# Patient Record
Sex: FEMALE | Race: WHITE | NOT HISPANIC OR LATINO | Employment: OTHER | URBAN - METROPOLITAN AREA
[De-identification: names, ages, dates, MRNs, and addresses within clinical notes are randomized per-mention and may not be internally consistent; named-entity substitution may affect disease eponyms.]

---

## 2023-03-07 DIAGNOSIS — F32.A DEPRESSION, UNSPECIFIED DEPRESSION TYPE: Primary | ICD-10-CM

## 2023-03-07 RX ORDER — DULOXETIN HYDROCHLORIDE 60 MG/1
60 CAPSULE, DELAYED RELEASE ORAL DAILY
Qty: 90 CAPSULE | Refills: 0 | Status: SHIPPED | OUTPATIENT
Start: 2023-03-07 | End: 2023-06-12

## 2023-03-07 RX ORDER — ATORVASTATIN CALCIUM 40 MG/1
1 TABLET, FILM COATED ORAL DAILY
COMMUNITY
Start: 2012-12-17 | End: 2023-07-20

## 2023-03-07 RX ORDER — TORSEMIDE 20 MG/1
1 TABLET ORAL DAILY
COMMUNITY
Start: 2019-04-04

## 2023-03-07 RX ORDER — SITAGLIPTIN AND METFORMIN HYDROCHLORIDE 500; 50 MG/1; MG/1
1 TABLET, FILM COATED ORAL DAILY
COMMUNITY
Start: 2017-02-28 | End: 2023-09-13 | Stop reason: SDUPTHER

## 2023-03-07 RX ORDER — DULOXETIN HYDROCHLORIDE 60 MG/1
1 CAPSULE, DELAYED RELEASE ORAL DAILY
COMMUNITY
Start: 2014-09-09 | End: 2023-03-07 | Stop reason: SDUPTHER

## 2023-03-07 RX ORDER — LEVOTHYROXINE SODIUM 137 UG/1
1 TABLET ORAL DAILY
COMMUNITY
Start: 2017-02-28 | End: 2023-06-12

## 2023-03-07 RX ORDER — CARVEDILOL 25 MG/1
25 TABLET ORAL
COMMUNITY

## 2023-03-07 RX ORDER — LISINOPRIL 2.5 MG/1
1 TABLET ORAL DAILY
COMMUNITY
Start: 2020-01-13 | End: 2023-04-25

## 2023-03-07 RX ORDER — SPIRONOLACTONE 25 MG/1
12.5 TABLET ORAL DAILY
COMMUNITY
Start: 2019-04-04 | End: 2024-01-30 | Stop reason: SDUPTHER

## 2023-04-25 DIAGNOSIS — I10 HYPERTENSION, UNSPECIFIED TYPE: Primary | ICD-10-CM

## 2023-04-25 RX ORDER — LISINOPRIL 2.5 MG/1
TABLET ORAL
Qty: 90 TABLET | Refills: 3 | Status: SHIPPED | OUTPATIENT
Start: 2023-04-25 | End: 2024-01-30 | Stop reason: SDUPTHER

## 2023-06-05 DIAGNOSIS — F32.A DEPRESSION, UNSPECIFIED DEPRESSION TYPE: ICD-10-CM

## 2023-06-05 DIAGNOSIS — E03.9 HYPOTHYROIDISM, UNSPECIFIED TYPE: ICD-10-CM

## 2023-06-12 RX ORDER — LEVOTHYROXINE SODIUM 137 UG/1
TABLET ORAL
Qty: 90 TABLET | Refills: 3 | Status: SHIPPED | OUTPATIENT
Start: 2023-06-12

## 2023-06-12 RX ORDER — DULOXETIN HYDROCHLORIDE 60 MG/1
CAPSULE, DELAYED RELEASE ORAL
Qty: 90 CAPSULE | Refills: 3 | Status: SHIPPED | OUTPATIENT
Start: 2023-06-12

## 2023-06-21 ENCOUNTER — APPOINTMENT (OUTPATIENT)
Dept: PRIMARY CARE | Facility: CLINIC | Age: 73
End: 2023-06-21
Payer: MEDICARE

## 2023-06-22 ENCOUNTER — OFFICE VISIT (OUTPATIENT)
Dept: PRIMARY CARE | Facility: CLINIC | Age: 73
End: 2023-06-22
Payer: MEDICARE

## 2023-06-22 ENCOUNTER — TELEPHONE (OUTPATIENT)
Dept: PRIMARY CARE | Facility: CLINIC | Age: 73
End: 2023-06-22

## 2023-06-22 VITALS
RESPIRATION RATE: 16 BRPM | BODY MASS INDEX: 47.77 KG/M2 | DIASTOLIC BLOOD PRESSURE: 62 MMHG | OXYGEN SATURATION: 97 % | HEIGHT: 61 IN | WEIGHT: 253 LBS | SYSTOLIC BLOOD PRESSURE: 116 MMHG | HEART RATE: 75 BPM

## 2023-06-22 DIAGNOSIS — M54.12 CERVICAL RADICULOPATHY: Primary | ICD-10-CM

## 2023-06-22 PROCEDURE — 1036F TOBACCO NON-USER: CPT | Performed by: STUDENT IN AN ORGANIZED HEALTH CARE EDUCATION/TRAINING PROGRAM

## 2023-06-22 PROCEDURE — 1159F MED LIST DOCD IN RCRD: CPT | Performed by: STUDENT IN AN ORGANIZED HEALTH CARE EDUCATION/TRAINING PROGRAM

## 2023-06-22 PROCEDURE — 99213 OFFICE O/P EST LOW 20 MIN: CPT | Performed by: STUDENT IN AN ORGANIZED HEALTH CARE EDUCATION/TRAINING PROGRAM

## 2023-06-22 PROCEDURE — 1160F RVW MEDS BY RX/DR IN RCRD: CPT | Performed by: STUDENT IN AN ORGANIZED HEALTH CARE EDUCATION/TRAINING PROGRAM

## 2023-06-22 RX ORDER — PREDNISONE 20 MG/1
TABLET ORAL
COMMUNITY
Start: 2023-06-19 | End: 2023-11-22 | Stop reason: WASHOUT

## 2023-06-22 RX ORDER — VIT C/E/ZN/COPPR/LUTEIN/ZEAXAN 250MG-90MG
CAPSULE ORAL
Status: ON HOLD | COMMUNITY
End: 2023-12-15 | Stop reason: DRUGHIGH

## 2023-06-22 RX ORDER — PEDI MULTIVIT NO.16 W-FLUORIDE 1 MG
TABLET,CHEWABLE ORAL
Status: ON HOLD | COMMUNITY
Start: 2013-12-03 | End: 2023-12-15 | Stop reason: SDUPTHER

## 2023-06-22 RX ORDER — BACLOFEN 20 MG
TABLET ORAL
Status: ON HOLD | COMMUNITY
Start: 2015-07-07 | End: 2023-12-15 | Stop reason: SDUPTHER

## 2023-06-22 RX ORDER — TIZANIDINE 4 MG/1
4 TABLET ORAL 3 TIMES DAILY
COMMUNITY
Start: 2023-06-19 | End: 2023-11-22 | Stop reason: WASHOUT

## 2023-06-22 RX ORDER — CALCIUM CARBONATE 600 MG
TABLET ORAL
Status: ON HOLD | COMMUNITY
End: 2023-12-15 | Stop reason: ALTCHOICE

## 2023-06-22 RX ORDER — LIDOCAINE 4 G/100G
PATCH TOPICAL
COMMUNITY
Start: 2022-09-18 | End: 2023-11-22 | Stop reason: WASHOUT

## 2023-06-22 RX ORDER — UBIDECARENONE 75 MG
CAPSULE ORAL
Status: ON HOLD | COMMUNITY
End: 2023-12-15 | Stop reason: ALTCHOICE

## 2023-06-22 RX ORDER — ASPIRIN 81 MG/1
1 TABLET ORAL DAILY
COMMUNITY
Start: 2015-05-14

## 2023-06-22 RX ORDER — EPINEPHRINE 0.22MG
200 AEROSOL WITH ADAPTER (ML) INHALATION DAILY
COMMUNITY
Start: 2017-02-28

## 2023-06-22 ASSESSMENT — PATIENT HEALTH QUESTIONNAIRE - PHQ9
SUM OF ALL RESPONSES TO PHQ9 QUESTIONS 1 AND 2: 0
2. FEELING DOWN, DEPRESSED OR HOPELESS: NOT AT ALL
1. LITTLE INTEREST OR PLEASURE IN DOING THINGS: NOT AT ALL

## 2023-06-22 ASSESSMENT — ENCOUNTER SYMPTOMS
LOSS OF SENSATION IN FEET: 0
OCCASIONAL FEELINGS OF UNSTEADINESS: 1
DEPRESSION: 0

## 2023-06-22 NOTE — PROGRESS NOTES
Subjective   Patient ID: Nadeen Nguyễn is a 72 y.o. female who presents for Follow-up (Pt was seen at the  on Monday and was told that she had a pinched nerve. Was placed in muscle relaxer and prednisone. Pt is now experiencing issues with her left issue and numbness with her left pinkie.).    Started having some left shoulder blade pain after doing vacation [place]   Has been pushing a person   Neck pain with radiation down the left shoulder   No chest pain or pressure   Went to urgent care on Monday  She was placed on steroid and muscle relaxant  Completing her taper which is improving   Her hand is tingling.  Getting a message tomorrow        Review of Systems    Objective   Physical Exam  Constitutional:       Appearance: Normal appearance.   Cardiovascular:      Rate and Rhythm: Normal rate and regular rhythm.      Heart sounds: No murmur heard.  Pulmonary:      Effort: Pulmonary effort is normal. No respiratory distress.      Breath sounds: Normal breath sounds. No wheezing.   Musculoskeletal:      Cervical back: Neck supple.      Left lower leg: No edema.   Neurological:      Mental Status: She is alert.         Assessment/Plan   Diagnoses and all orders for this visit:  Cervical radiculopathy  Comments:  continue your medrol dose pack  exercises given   rest from strenuous activity

## 2023-07-05 ENCOUNTER — TELEPHONE (OUTPATIENT)
Dept: PRIMARY CARE | Facility: CLINIC | Age: 73
End: 2023-07-05
Payer: MEDICARE

## 2023-07-05 DIAGNOSIS — M54.12 CERVICAL RADICULOPATHY: Primary | ICD-10-CM

## 2023-07-05 NOTE — TELEPHONE ENCOUNTER
Pt called asking for a PT referral, states she was scheduled in a few weeks to see PT, but they happened to call her due to a cancellation and were able to get her in tomorrow at 11:00.     She just needs a PT referral letter, once completed I can fax to [facility] at [phone].

## 2023-07-20 DIAGNOSIS — E78.5 HYPERLIPIDEMIA, UNSPECIFIED HYPERLIPIDEMIA TYPE: ICD-10-CM

## 2023-07-20 RX ORDER — ATORVASTATIN CALCIUM 40 MG/1
40 TABLET, FILM COATED ORAL DAILY
Qty: 90 TABLET | Refills: 1 | Status: SHIPPED | OUTPATIENT
Start: 2023-07-20

## 2023-08-15 ENCOUNTER — OFFICE VISIT (OUTPATIENT)
Dept: PRIMARY CARE | Facility: CLINIC | Age: 73
End: 2023-08-15
Payer: MEDICARE

## 2023-08-15 VITALS
OXYGEN SATURATION: 97 % | WEIGHT: 247.6 LBS | RESPIRATION RATE: 18 BRPM | DIASTOLIC BLOOD PRESSURE: 64 MMHG | BODY MASS INDEX: 46.75 KG/M2 | SYSTOLIC BLOOD PRESSURE: 118 MMHG | HEIGHT: 61 IN | HEART RATE: 91 BPM

## 2023-08-15 DIAGNOSIS — E11.9 TYPE 2 DIABETES MELLITUS WITHOUT COMPLICATION, WITHOUT LONG-TERM CURRENT USE OF INSULIN (MULTI): ICD-10-CM

## 2023-08-15 DIAGNOSIS — Z00.00 ROUTINE GENERAL MEDICAL EXAMINATION AT HEALTH CARE FACILITY: Primary | ICD-10-CM

## 2023-08-15 DIAGNOSIS — Z12.31 VISIT FOR SCREENING MAMMOGRAM: ICD-10-CM

## 2023-08-15 DIAGNOSIS — I10 ESSENTIAL HYPERTENSION: ICD-10-CM

## 2023-08-15 DIAGNOSIS — I49.01 VENTRICULAR FIBRILLATION (MULTI): ICD-10-CM

## 2023-08-15 DIAGNOSIS — E03.9 HYPOTHYROIDISM, UNSPECIFIED TYPE: ICD-10-CM

## 2023-08-15 DIAGNOSIS — Z23 IMMUNIZATION DUE: ICD-10-CM

## 2023-08-15 DIAGNOSIS — H90.6 MIXED CONDUCTIVE AND SENSORINEURAL HEARING LOSS OF BOTH EARS: ICD-10-CM

## 2023-08-15 DIAGNOSIS — I47.20 VENTRICULAR TACHYCARDIA (MULTI): ICD-10-CM

## 2023-08-15 PROBLEM — G47.33 OBSTRUCTIVE SLEEP APNEA: Status: ACTIVE | Noted: 2023-08-15

## 2023-08-15 PROBLEM — F32.A DEPRESSION: Status: ACTIVE | Noted: 2023-08-15

## 2023-08-15 PROBLEM — I20.9 ANGINA PECTORIS (CMS-HCC): Status: ACTIVE | Noted: 2023-08-15
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
## 2023-08-15 PROBLEM — H91.90 HEARING LOSS: Status: ACTIVE | Noted: 2023-08-15

## 2023-08-15 PROBLEM — I50.9 HEART FAILURE (MULTI): Status: ACTIVE | Noted: 2023-08-15

## 2023-08-15 PROBLEM — M25.511 CHRONIC RIGHT SHOULDER PAIN: Status: ACTIVE | Noted: 2023-08-15

## 2023-08-15 PROBLEM — R06.02 SHORTNESS OF BREATH: Status: ACTIVE | Noted: 2023-08-15

## 2023-08-15 PROBLEM — I25.10 CORONARY ARTERY DISEASE: Status: ACTIVE | Noted: 2023-08-15

## 2023-08-15 PROBLEM — G89.29 CHRONIC RIGHT SHOULDER PAIN: Status: ACTIVE | Noted: 2023-08-15

## 2023-08-15 PROBLEM — F51.12 INSUFFICIENT SLEEP SYNDROME: Status: ACTIVE | Noted: 2023-08-15

## 2023-08-15 PROBLEM — H00.016 HORDEOLUM EXTERNUM OF LEFT EYE: Status: ACTIVE | Noted: 2023-08-15

## 2023-08-15 PROBLEM — E78.2 MIXED HYPERLIPIDEMIA: Status: ACTIVE | Noted: 2023-08-15

## 2023-08-15 PROBLEM — Z95.810 CARDIAC DEFIBRILLATOR IN PLACE: Status: ACTIVE | Noted: 2023-08-15

## 2023-08-15 PROBLEM — E78.5 HYPERLIPIDEMIA: Status: ACTIVE | Noted: 2023-08-15

## 2023-08-15 PROBLEM — M53.86 SCIATICA OF RIGHT SIDE ASSOCIATED WITH DISORDER OF LUMBAR SPINE: Status: ACTIVE | Noted: 2023-08-15

## 2023-08-15 PROBLEM — I25.118 ATHEROSCLEROTIC HEART DISEASE OF NATIVE CORONARY ARTERY WITH OTHER FORMS OF ANGINA PECTORIS (CMS-HCC): Status: ACTIVE | Noted: 2023-08-15

## 2023-08-15 PROBLEM — D72.10 EOSINOPHILIA: Status: ACTIVE | Noted: 2023-08-15

## 2023-08-15 PROBLEM — M17.10 ARTHRITIS OF KNEE: Status: ACTIVE | Noted: 2023-08-15

## 2023-08-15 PROBLEM — I42.9 CARDIOMYOPATHY (MULTI): Status: ACTIVE | Noted: 2023-08-15

## 2023-08-15 PROBLEM — G47.36 SLEEP-RELATED HYPOVENTILATION DUE TO MEDICAL CONDITION: Status: ACTIVE | Noted: 2023-08-15

## 2023-08-15 PROBLEM — E66.01 MORBID OBESITY (MULTI): Status: ACTIVE | Noted: 2023-08-15

## 2023-08-15 LAB — POC HEMOGLOBIN A1C: 7.1 % (ref 4.2–6.5)

## 2023-08-15 PROCEDURE — 90471 IMMUNIZATION ADMIN: CPT | Performed by: STUDENT IN AN ORGANIZED HEALTH CARE EDUCATION/TRAINING PROGRAM

## 2023-08-15 PROCEDURE — 3078F DIAST BP <80 MM HG: CPT | Performed by: STUDENT IN AN ORGANIZED HEALTH CARE EDUCATION/TRAINING PROGRAM

## 2023-08-15 PROCEDURE — 83036 HEMOGLOBIN GLYCOSYLATED A1C: CPT | Performed by: STUDENT IN AN ORGANIZED HEALTH CARE EDUCATION/TRAINING PROGRAM

## 2023-08-15 PROCEDURE — 1160F RVW MEDS BY RX/DR IN RCRD: CPT | Performed by: STUDENT IN AN ORGANIZED HEALTH CARE EDUCATION/TRAINING PROGRAM

## 2023-08-15 PROCEDURE — 1126F AMNT PAIN NOTED NONE PRSNT: CPT | Performed by: STUDENT IN AN ORGANIZED HEALTH CARE EDUCATION/TRAINING PROGRAM

## 2023-08-15 PROCEDURE — 90715 TDAP VACCINE 7 YRS/> IM: CPT | Performed by: STUDENT IN AN ORGANIZED HEALTH CARE EDUCATION/TRAINING PROGRAM

## 2023-08-15 PROCEDURE — 4010F ACE/ARB THERAPY RXD/TAKEN: CPT | Performed by: STUDENT IN AN ORGANIZED HEALTH CARE EDUCATION/TRAINING PROGRAM

## 2023-08-15 PROCEDURE — 1036F TOBACCO NON-USER: CPT | Performed by: STUDENT IN AN ORGANIZED HEALTH CARE EDUCATION/TRAINING PROGRAM

## 2023-08-15 PROCEDURE — 1170F FXNL STATUS ASSESSED: CPT | Performed by: STUDENT IN AN ORGANIZED HEALTH CARE EDUCATION/TRAINING PROGRAM

## 2023-08-15 PROCEDURE — 3074F SYST BP LT 130 MM HG: CPT | Performed by: STUDENT IN AN ORGANIZED HEALTH CARE EDUCATION/TRAINING PROGRAM

## 2023-08-15 PROCEDURE — 99214 OFFICE O/P EST MOD 30 MIN: CPT | Performed by: STUDENT IN AN ORGANIZED HEALTH CARE EDUCATION/TRAINING PROGRAM

## 2023-08-15 PROCEDURE — 1159F MED LIST DOCD IN RCRD: CPT | Performed by: STUDENT IN AN ORGANIZED HEALTH CARE EDUCATION/TRAINING PROGRAM

## 2023-08-15 PROCEDURE — G0439 PPPS, SUBSEQ VISIT: HCPCS | Performed by: STUDENT IN AN ORGANIZED HEALTH CARE EDUCATION/TRAINING PROGRAM

## 2023-08-15 RX ORDER — BISACODYL 5 MG/1
1 TABLET, COATED ORAL DAILY
COMMUNITY

## 2023-08-15 RX ORDER — IBUPROFEN 200 MG
400 CAPSULE ORAL 2 TIMES DAILY
COMMUNITY

## 2023-08-15 ASSESSMENT — PATIENT HEALTH QUESTIONNAIRE - PHQ9
1. LITTLE INTEREST OR PLEASURE IN DOING THINGS: NOT AT ALL
SUM OF ALL RESPONSES TO PHQ9 QUESTIONS 1 AND 2: 0
2. FEELING DOWN, DEPRESSED OR HOPELESS: NOT AT ALL

## 2023-08-15 ASSESSMENT — ACTIVITIES OF DAILY LIVING (ADL)
TAKING_MEDICATION: INDEPENDENT
DOING_HOUSEWORK: INDEPENDENT
MANAGING_FINANCES: INDEPENDENT
BATHING: INDEPENDENT
GROCERY_SHOPPING: INDEPENDENT
DRESSING: INDEPENDENT

## 2023-08-15 NOTE — ASSESSMENT & PLAN NOTE
In office A1C was 7.1 up from 6.1    At this time, we will continue current medications. W. Advised to avoid fatty foods such as processed food, weets; avoid foods heavy in sugar and salt. Maintain regular exercise 20 min daily or a total of 120 min weekly of moderate exercise.   labs:cbc, cmp

## 2023-08-15 NOTE — ASSESSMENT & PLAN NOTE
>>ASSESSMENT AND PLAN FOR TYPE 2 DIABETES MELLITUS WRITTEN ON 8/15/2023 10:13 AM BY MARCELINA GARZA, DO    In office A1C was 7.1 up from 6.1    At this time, we will continue current medications. W. Advised to avoid fatty foods such as processed food, weets; avoid foods heavy in sugar and salt. Maintain regular exercise 20 min daily or a total of 120 min weekly of moderate exercise.   labs:cbc, cmp

## 2023-08-15 NOTE — ASSESSMENT & PLAN NOTE
refilled lisinopril 2.5mg  Physical exam was stable. Discussed maintaining diets such as DASH to help maintain normal Blood pressure.

## 2023-08-15 NOTE — PROGRESS NOTES
"Subjective   Reason for Visit: Nadeen Nguyễn is an 73 y.o. female here for a Medicare Wellness visit.     Past Medical, Surgical, and Family History reviewed and updated in chart.    Reviewed all medications by prescribing practitioner or clinical pharmacist (such as prescriptions, OTCs, herbal therapies and supplements) and documented in the medical record.    Interval Hx:    Cervical Pain continues  but  Physical therapy has been very helpful          Patient Care Team:  Jennifer Osorio DO as PCP - General  Jennifer Osorio DO as PCP - [employer]age PCP  Shailesh Valerio MD (Internal Medicine)  Jennifer Osorio DO     Review of Systems    Objective   Vitals:  /64   Pulse 91   Resp 18   Ht 1.549 m (5' 1\")   Wt 112 kg (247 lb 9.6 oz)   SpO2 97%   BMI 46.78 kg/m²       Physical Exam  Vitals reviewed.   Constitutional:       General: She is not in acute distress.     Appearance: She is not ill-appearing.   HENT:      Right Ear: Tympanic membrane and ear canal normal.      Left Ear: Tympanic membrane and ear canal normal.      Mouth/Throat:      Mouth: Mucous membranes are moist.      Pharynx: Oropharynx is clear. No oropharyngeal exudate or posterior oropharyngeal erythema.   Eyes:      Extraocular Movements: Extraocular movements intact.      Conjunctiva/sclera: Conjunctivae normal.      Pupils: Pupils are equal, round, and reactive to light.   Neck:      Vascular: No carotid bruit.   Cardiovascular:      Rate and Rhythm: Normal rate and regular rhythm.      Heart sounds: No murmur heard.     No gallop.   Pulmonary:      Effort: Pulmonary effort is normal.      Breath sounds: Normal breath sounds. No wheezing, rhonchi or rales.   Abdominal:      General: Abdomen is flat. Bowel sounds are normal.      Palpations: Abdomen is soft.      Tenderness: There is no abdominal tenderness.   Musculoskeletal:      Cervical back: Neck supple.      Left lower leg: No edema.   Skin:     General: Skin is " warm and dry.      Findings: No rash.   Neurological:      General: No focal deficit present.      Mental Status: She is alert and oriented to person, place, and time.      Gait: Gait normal.   Psychiatric:         Mood and Affect: Mood normal.         Behavior: Behavior normal.       Assessment/Plan   Problem List Items Addressed This Visit       Essential hypertension    Current Assessment & Plan     refilled lisinopril 2.5mg  Physical exam was stable. Discussed maintaining diets such as DASH to help maintain normal Blood pressure.             Relevant Orders    CBC and Auto Differential    Comprehensive metabolic panel    Lipid Panel    Hearing loss    Relevant Orders    Referral to Audiology    Hypothyroidism    Relevant Orders    Tsh With Reflex To Free T4 If Abnormal    Type 2 diabetes mellitus (CMS/HCC)    Current Assessment & Plan     In office A1C was 7.1 up from 6.1    At this time, we will continue current medications. W. Advised to avoid fatty foods such as processed food, weets; avoid foods heavy in sugar and salt. Maintain regular exercise 20 min daily or a total of 120 min weekly of moderate exercise.   labs:cbc, cmp         Relevant Orders    POCT glycosylated hemoglobin (Hb A1C) manually resulted (Completed)    Ventricular fibrillation (CMS/HCC)    Ventricular tachycardia (CMS/HCC)     Other Visit Diagnoses       Routine general medical examination at health care facility    -  Primary    doing well  will update audiology visit   mammogram order    Visit for screening mammogram        Relevant Orders    BI mammo bilateral screening tomosynthesis    Immunization due        Relevant Orders    Tdap vaccine, age 10 years and older  (BOOSTRIX)

## 2023-09-13 DIAGNOSIS — E11.9 TYPE 2 DIABETES MELLITUS WITHOUT COMPLICATION, WITHOUT LONG-TERM CURRENT USE OF INSULIN (MULTI): ICD-10-CM

## 2023-09-13 RX ORDER — SITAGLIPTIN AND METFORMIN HYDROCHLORIDE 500; 50 MG/1; MG/1
1 TABLET, FILM COATED ORAL DAILY
Qty: 90 TABLET | Refills: 1 | Status: SHIPPED | OUTPATIENT
Start: 2023-09-13 | End: 2024-05-20 | Stop reason: SDUPTHER

## 2023-09-19 ENCOUNTER — LAB (OUTPATIENT)
Dept: LAB | Facility: LAB | Age: 73
End: 2023-09-19
Payer: MEDICARE

## 2023-09-19 DIAGNOSIS — I10 ESSENTIAL HYPERTENSION: ICD-10-CM

## 2023-09-19 DIAGNOSIS — E03.9 HYPOTHYROIDISM, UNSPECIFIED TYPE: ICD-10-CM

## 2023-09-19 LAB
BASOPHILS (10*3/UL) IN BLOOD BY AUTOMATED COUNT: 0.1 X10E9/L (ref 0–0.1)
BASOPHILS/100 LEUKOCYTES IN BLOOD BY AUTOMATED COUNT: 0.9 % (ref 0–2)
EOSINOPHILS (10*3/UL) IN BLOOD BY AUTOMATED COUNT: 0.46 X10E9/L (ref 0–0.4)
EOSINOPHILS/100 LEUKOCYTES IN BLOOD BY AUTOMATED COUNT: 4.1 % (ref 0–6)
ERYTHROCYTE DISTRIBUTION WIDTH (RATIO) BY AUTOMATED COUNT: 12.8 % (ref 11.5–14.5)
ERYTHROCYTE MEAN CORPUSCULAR HEMOGLOBIN CONCENTRATION (G/DL) BY AUTOMATED: 32.4 G/DL (ref 32–36)
ERYTHROCYTE MEAN CORPUSCULAR VOLUME (FL) BY AUTOMATED COUNT: 91 FL (ref 80–100)
ERYTHROCYTES (10*6/UL) IN BLOOD BY AUTOMATED COUNT: 5 X10E12/L (ref 4–5.2)
HEMATOCRIT (%) IN BLOOD BY AUTOMATED COUNT: 45.4 % (ref 36–46)
HEMOGLOBIN (G/DL) IN BLOOD: 14.7 G/DL (ref 12–16)
IMMATURE GRANULOCYTES/100 LEUKOCYTES IN BLOOD BY AUTOMATED COUNT: 0.5 % (ref 0–0.9)
LEUKOCYTES (10*3/UL) IN BLOOD BY AUTOMATED COUNT: 11.2 X10E9/L (ref 4.4–11.3)
LYMPHOCYTES (10*3/UL) IN BLOOD BY AUTOMATED COUNT: 2.2 X10E9/L (ref 0.8–3)
LYMPHOCYTES/100 LEUKOCYTES IN BLOOD BY AUTOMATED COUNT: 19.7 % (ref 13–44)
MONOCYTES (10*3/UL) IN BLOOD BY AUTOMATED COUNT: 0.99 X10E9/L (ref 0.05–0.8)
MONOCYTES/100 LEUKOCYTES IN BLOOD BY AUTOMATED COUNT: 8.9 % (ref 2–10)
NEUTROPHILS (10*3/UL) IN BLOOD BY AUTOMATED COUNT: 7.34 X10E9/L (ref 1.6–5.5)
NEUTROPHILS/100 LEUKOCYTES IN BLOOD BY AUTOMATED COUNT: 65.9 % (ref 40–80)
PLATELETS (10*3/UL) IN BLOOD AUTOMATED COUNT: 228 X10E9/L (ref 150–450)

## 2023-09-19 PROCEDURE — 80061 LIPID PANEL: CPT

## 2023-09-19 PROCEDURE — 84443 ASSAY THYROID STIM HORMONE: CPT

## 2023-09-19 PROCEDURE — 36415 COLL VENOUS BLD VENIPUNCTURE: CPT

## 2023-09-19 PROCEDURE — 85025 COMPLETE CBC W/AUTO DIFF WBC: CPT

## 2023-09-19 PROCEDURE — 80053 COMPREHEN METABOLIC PANEL: CPT

## 2023-09-20 DIAGNOSIS — N17.9 AKI (ACUTE KIDNEY INJURY) (CMS-HCC): Primary | ICD-10-CM

## 2023-09-20 LAB
ALANINE AMINOTRANSFERASE (SGPT) (U/L) IN SER/PLAS: 11 U/L (ref 7–45)
ALBUMIN (G/DL) IN SER/PLAS: 4 G/DL (ref 3.4–5)
ALKALINE PHOSPHATASE (U/L) IN SER/PLAS: 81 U/L (ref 33–136)
ANION GAP IN SER/PLAS: 17 MMOL/L (ref 10–20)
ASPARTATE AMINOTRANSFERASE (SGOT) (U/L) IN SER/PLAS: 18 U/L (ref 9–39)
BILIRUBIN TOTAL (MG/DL) IN SER/PLAS: 1.7 MG/DL (ref 0–1.2)
CALCIUM (MG/DL) IN SER/PLAS: 9.6 MG/DL (ref 8.6–10.3)
CARBON DIOXIDE, TOTAL (MMOL/L) IN SER/PLAS: 28 MMOL/L (ref 21–32)
CHLORIDE (MMOL/L) IN SER/PLAS: 100 MMOL/L (ref 98–107)
CHOLESTEROL (MG/DL) IN SER/PLAS: 142 MG/DL (ref 0–199)
CHOLESTEROL IN HDL (MG/DL) IN SER/PLAS: 49.6 MG/DL
CHOLESTEROL/HDL RATIO: 2.9
CREATININE (MG/DL) IN SER/PLAS: 1.16 MG/DL (ref 0.5–1.05)
GFR FEMALE: 50 ML/MIN/1.73M2
GLUCOSE (MG/DL) IN SER/PLAS: 163 MG/DL (ref 74–99)
LDL: 59 MG/DL (ref 0–99)
POTASSIUM (MMOL/L) IN SER/PLAS: 5 MMOL/L (ref 3.5–5.3)
PROTEIN TOTAL: 7 G/DL (ref 6.4–8.2)
SODIUM (MMOL/L) IN SER/PLAS: 140 MMOL/L (ref 136–145)
THYROTROPIN (MIU/L) IN SER/PLAS BY DETECTION LIMIT <= 0.05 MIU/L: 0.61 MIU/L (ref 0.44–3.98)
TRIGLYCERIDE (MG/DL) IN SER/PLAS: 166 MG/DL (ref 0–149)
UREA NITROGEN (MG/DL) IN SER/PLAS: 22 MG/DL (ref 6–23)
VLDL: 33 MG/DL (ref 0–40)

## 2023-11-08 ENCOUNTER — OFFICE VISIT (OUTPATIENT)
Dept: CARDIOLOGY | Facility: CLINIC | Age: 73
End: 2023-11-08
Payer: MEDICARE

## 2023-11-08 ENCOUNTER — HOSPITAL ENCOUNTER (OUTPATIENT)
Dept: CARDIOLOGY | Facility: CLINIC | Age: 73
Discharge: HOME | End: 2023-11-08
Payer: MEDICARE

## 2023-11-08 DIAGNOSIS — I42.9 CARDIOMYOPATHY, UNSPECIFIED TYPE (MULTI): ICD-10-CM

## 2023-11-08 DIAGNOSIS — Z95.810 CARDIAC DEFIBRILLATOR IN PLACE: ICD-10-CM

## 2023-11-08 DIAGNOSIS — I42.9 CARDIOMYOPATHY, UNSPECIFIED TYPE (MULTI): Primary | ICD-10-CM

## 2023-11-08 DIAGNOSIS — I49.01 VENTRICULAR FIBRILLATION (MULTI): Primary | ICD-10-CM

## 2023-11-08 DIAGNOSIS — I10 ESSENTIAL HYPERTENSION: ICD-10-CM

## 2023-11-08 PROCEDURE — 93284 PRGRMG EVAL IMPLANTABLE DFB: CPT | Performed by: INTERNAL MEDICINE

## 2023-11-08 PROCEDURE — 4010F ACE/ARB THERAPY RXD/TAKEN: CPT | Performed by: INTERNAL MEDICINE

## 2023-11-08 PROCEDURE — 99215 OFFICE O/P EST HI 40 MIN: CPT | Mod: 25 | Performed by: INTERNAL MEDICINE

## 2023-11-08 PROCEDURE — 3078F DIAST BP <80 MM HG: CPT | Performed by: INTERNAL MEDICINE

## 2023-11-08 PROCEDURE — 93290 INTERROG DEV EVAL ICPMS IP: CPT

## 2023-11-08 PROCEDURE — 1126F AMNT PAIN NOTED NONE PRSNT: CPT | Performed by: INTERNAL MEDICINE

## 2023-11-08 PROCEDURE — 1160F RVW MEDS BY RX/DR IN RCRD: CPT | Performed by: INTERNAL MEDICINE

## 2023-11-08 PROCEDURE — 93290 INTERROG DEV EVAL ICPMS IP: CPT | Performed by: INTERNAL MEDICINE

## 2023-11-08 PROCEDURE — 3074F SYST BP LT 130 MM HG: CPT | Performed by: INTERNAL MEDICINE

## 2023-11-08 PROCEDURE — 1036F TOBACCO NON-USER: CPT | Performed by: INTERNAL MEDICINE

## 2023-11-08 PROCEDURE — 1159F MED LIST DOCD IN RCRD: CPT | Performed by: INTERNAL MEDICINE

## 2023-11-08 PROCEDURE — 99205 OFFICE O/P NEW HI 60 MIN: CPT | Performed by: INTERNAL MEDICINE

## 2023-11-08 NOTE — PROGRESS NOTES
OUTPATIENT CONSULTATION: Cardiac Electrophysiology  DOS: 11/08/2023  REASON: Malfunctioning ICD  REFERRING PHYSICIAN: [facility]      I had a pleasure seeing Nadeen Nguyễn. She is 73 year old with    S/p CRT-D in 2014 for low EF  Complete heart block - she has no escape rhythm  S/p battery change out ([employer]) in 2017 for recalled battery  Today, we did check the device and her LV lead is not capturing. She has felt more short of breath.     [unfilled]  Physical Exam  There were no vitals taken for this visit.    General Appearance:  Alert, cooperative, no distress, appears stated age   Head:  Normocephalic, without obvious abnormality, atraumatic   Eyes:  PERRL, conjunctiva/corneas clear, EOM's intact, fundi benign, both eyes   Ears:  Normal TM's and external ear canals, both ears   Nose: Nares normal, septum midline,mucosa normal, no drainage or sinus tenderness   Throat: Lips, mucosa, and tongue normal; teeth and gums normal   Neck: Supple, symmetrical, trachea midline, no adenopathy;  thyroid: not enlarged, symmetric, no tenderness/mass/nodules; no carotid bruit or JVD   Back:   Symmetric, no curvature, ROM normal, no CVA tenderness   Lungs:   Clear to auscultation bilaterally, respirations unlabored   Breasts:  No masses or tenderness   Heart:  Regular rate and rhythm, S1 and S2 normal, no murmur, rub, or gallop   Abdomen:   Soft, non-tender, bowel sounds active all four quadrants,  no masses, no organomegaly   Pelvic: Deferred   Extremities: Extremities normal, atraumatic, no cyanosis or edema   Pulses: 2+ and symmetric   Skin: Skin color, texture, turgor normal, no rashes or lesions   Lymph nodes: Cervical, supraclavicular, and axillary nodes normal   Neurologic: Normal       LV lead is not capturing at highest outputs. She is only RV pacing. I reached to her cardiologist at [facility] to assess the medical therapy and last EF. I do suspect she will need LV revision and probably the best would be to place an  epicardial lead.       I did reach to her cardiologist to obtain the records.

## 2023-11-08 NOTE — ASSESSMENT & PLAN NOTE
LV lead is not capturing at highest outputs. She is only RV pacing. I reached to her cardiologist at [facility] to assess the medical therapy and last EF. I do suspect she will need LV revision and probably the best would be to place an epicardial lead.

## 2023-11-20 PROBLEM — D72.829 LEUKOCYTOSIS: Status: ACTIVE | Noted: 2023-11-20
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
## 2023-11-20 PROBLEM — R29.6 FALLS: Status: ACTIVE | Noted: 2023-11-20

## 2023-11-20 PROBLEM — H90.3 SENSORINEURAL HEARING LOSS (SNHL) OF BOTH EARS: Status: ACTIVE | Noted: 2023-11-20

## 2023-11-22 ENCOUNTER — OFFICE VISIT (OUTPATIENT)
Dept: CARDIOLOGY | Facility: CLINIC | Age: 73
End: 2023-11-22
Payer: MEDICARE

## 2023-11-22 VITALS
RESPIRATION RATE: 18 BRPM | HEART RATE: 71 BPM | OXYGEN SATURATION: 96 % | HEIGHT: 61 IN | BODY MASS INDEX: 45.65 KG/M2 | WEIGHT: 241.8 LBS | DIASTOLIC BLOOD PRESSURE: 70 MMHG | TEMPERATURE: 98.9 F | SYSTOLIC BLOOD PRESSURE: 136 MMHG

## 2023-11-22 DIAGNOSIS — I20.9 ANGINA PECTORIS (CMS-HCC): ICD-10-CM

## 2023-11-22 DIAGNOSIS — I10 PRIMARY HYPERTENSION: ICD-10-CM

## 2023-11-22 DIAGNOSIS — I50.32 CHRONIC DIASTOLIC HEART FAILURE (MULTI): ICD-10-CM

## 2023-11-22 DIAGNOSIS — I10 ESSENTIAL HYPERTENSION: ICD-10-CM

## 2023-11-22 DIAGNOSIS — E78.2 MIXED HYPERLIPIDEMIA: ICD-10-CM

## 2023-11-22 DIAGNOSIS — I25.118 ATHEROSCLEROTIC HEART DISEASE OF NATIVE CORONARY ARTERY WITH OTHER FORMS OF ANGINA PECTORIS (CMS-HCC): ICD-10-CM

## 2023-11-22 DIAGNOSIS — I42.0 DILATED CARDIOMYOPATHY (MULTI): Primary | ICD-10-CM

## 2023-11-22 PROCEDURE — 4010F ACE/ARB THERAPY RXD/TAKEN: CPT | Performed by: INTERNAL MEDICINE

## 2023-11-22 PROCEDURE — 1126F AMNT PAIN NOTED NONE PRSNT: CPT | Performed by: INTERNAL MEDICINE

## 2023-11-22 PROCEDURE — 3075F SYST BP GE 130 - 139MM HG: CPT | Performed by: INTERNAL MEDICINE

## 2023-11-22 PROCEDURE — 1159F MED LIST DOCD IN RCRD: CPT | Performed by: INTERNAL MEDICINE

## 2023-11-22 PROCEDURE — 1036F TOBACCO NON-USER: CPT | Performed by: INTERNAL MEDICINE

## 2023-11-22 PROCEDURE — 3078F DIAST BP <80 MM HG: CPT | Performed by: INTERNAL MEDICINE

## 2023-11-22 PROCEDURE — 93000 ELECTROCARDIOGRAM COMPLETE: CPT | Performed by: INTERNAL MEDICINE

## 2023-11-22 PROCEDURE — 99213 OFFICE O/P EST LOW 20 MIN: CPT | Performed by: INTERNAL MEDICINE

## 2023-11-22 PROCEDURE — 1160F RVW MEDS BY RX/DR IN RCRD: CPT | Performed by: INTERNAL MEDICINE

## 2023-11-22 ASSESSMENT — ENCOUNTER SYMPTOMS
DEPRESSION: 0
LOSS OF SENSATION IN FEET: 1
OCCASIONAL FEELINGS OF UNSTEADINESS: 1

## 2023-11-22 ASSESSMENT — PAIN SCALES - GENERAL: PAINLEVEL: 0-NO PAIN

## 2023-11-22 NOTE — PROGRESS NOTES
Subjective   Chief Complaint   Patient presents with    Follow-up     Mrs\Ms. Nguyễn is present for her 6 month  Follow up with Dr. Johnson   Needs to discuss pacemaker issue       73-year-old patient with a history of hypertension, hyperlipidemia history of chronic stable diastolic CHF.  Currently on aspirin, lisinopril, carvedilol as well as Lipitor  Patient had a negative nuclear stress test for ischemia in 2022.  Also history of permanent pacemaker/AICD placed.  History of CABG November 2007, PCI of RCA in September 2008, bariatric surgery in the past.  History of paroxysmal atrial fibrillation.  She has upcoming ventricular lead revision.  No active chest pain tightness.  Feeling fairly okay.    Past Medical History:   Diagnosis Date    Angina pectoris (CMS/[facility]) 08/15/2023    Atherosclerotic heart disease of native coronary artery with other forms of angina pectoris (CMS/[facility]) 08/15/2023    Cardiac defibrillator in place 08/15/2023    Cardiomyopathy (CMS/[facility]) 08/15/2023    Encounter for screening mammogram for malignant neoplasm of breast 04/11/2016    Visit for screening mammogram    Heart failure (CMS/[facility]) 08/15/2023    Hyperlipidemia 08/15/2023    Hypertension 08/15/2023    Hypothyroidism 08/15/2023    Obstructive sleep apnea 08/15/2023    Personal history of other diseases of the nervous system and sense organs     History of sleep apnea    Spinal stenosis, lumbar region without neurogenic claudication     Lumbar canal stenosis    Spondylosis without myelopathy or radiculopathy, cervical region 10/29/2015    Spondylosis of cervical region without myelopathy or radiculopathy    Type 2 diabetes mellitus (CMS/[facility]) 08/15/2023    Ventricular fibrillation (CMS/[facility]) 08/15/2023    Ventricular tachycardia (CMS/[facility]) 08/15/2023     Past Surgical History:   Procedure Laterality Date    ABLATION OF DYSRHYTHMIC FOCUS      CARDIAC CATHETERIZATION      CARDIAC PACEMAKER PLACEMENT  04/11/2016    Pacemaker Placement    CARDIAC  PACEMAKER PLACEMENT  06/14/2013    Pacemaker Placement    CHOLECYSTECTOMY  03/21/2013    Cholecystectomy    COLONOSCOPY  10/09/2019    Complete Colonoscopy    CORONARY STENT PLACEMENT      GALLBLADDER SURGERY  06/18/2013    Gallbladder Surgery    OTHER SURGICAL HISTORY  11/22/2013    Gastric Surgery For Morbid Obesity Laparoscopic Longitudinal Gastrectomy    TONSILLECTOMY  06/18/2013    Tonsillectomy     Family medical history includes stroke in father.  Current Outpatient Medications   Medication Sig Dispense Refill    ascorbic acid (Vitamin C) 100 mg tablet Take by mouth.      aspirin 81 mg EC tablet Take 1 tablet (81 mg) by mouth once daily.      atorvastatin (Lipitor) 40 mg tablet TAKE 1 TABLET DAILY 90 tablet 1    calcium carbonate 600 mg calcium (1,500 mg) tablet Take by mouth.      CALCIUM CITRATE ORAL as directed Orally      carvedilol (Coreg) 25 mg tablet Take 1 tablet (25 mg) by mouth 2 times a day with meals.      cholecalciferol (Vitamin D3) 25 MCG (1000 UT) capsule Take by mouth.      coenzyme Q-10 100 mg capsule Take by mouth.      cyanocobalamin (Vitamin B-12) 500 mcg tablet Take by mouth.      DULoxetine (Cymbalta) 60 mg DR capsule TAKE 1 CAPSULE DAILY 90 capsule 3    levothyroxine (Synthroid, Levoxyl) 137 mcg tablet TAKE 1 TABLET DAILY 90 tablet 3    lisinopril 2.5 mg tablet TAKE 1 TABLET DAILY 90 tablet 3    magnesium oxide 500 mg tablet Take by mouth.      multivitamin-iron-minerals-folic acid (Multivitamin 50 Plus) tablet once every 24 hours.      pedi multivit no.17 w-fluoride (Multivitamins With Fluoride) 1 mg chewable tablet Chew once daily.      SITagliptin phos-metformin (Janumet)  mg tablet Take 1 tablet by mouth once daily. 90 tablet 1    spironolactone (Aldactone) 25 mg tablet Take 0.5 tablets (12.5 mg) by mouth once daily.      torsemide (Demadex) 20 mg tablet Take 1 tablet (20 mg) by mouth once daily.       No current facility-administered medications for this visit.      reports  "that she has never smoked. She has never been exposed to tobacco smoke. She has never used smokeless tobacco. She reports that she does not drink alcohol and does not use drugs.  Azithromycin, Erythromycin, and Nsaids (non-steroidal anti-inflammatory drug)  Dilated cardiomyopathy (CMS/HCC)    Vitals:    11/22/23 1005   BP: 136/70   Pulse: 71   Resp: 18   Temp: 37.2 °C (98.9 °F)   TempSrc: Core   SpO2: 96%   Weight: 110 kg (241 lb 12.8 oz)   Height: 1.549 m (5' 1\")   PainSc: 0-No pain      BMI:Body mass index is 45.69 kg/m².   General Cardiology:  General Appearance: Alert, oriented and in no acute distress.  HEENT: extra ocular movements intact (EOMI), pupils equal,  round, reactive to light and accommodation (PERRLA).  Carotid Upstroke: no bruit, normal.  Jugular Venous Distention (JVD): flat.  Chest: normal.  Lungs: Clear to auscultation,   Heart Sounds: no S3 or S4, normal S1, S2, regular rate.  Murmur, Click, Gallop: no systolic murmur.  Abdomen: no hepatomegaly, no masses felt, soft.  Extremities: no leg edema.  Peripheral pulses: 2 plus bilateral.  NEUROLOGY Cranial nerves II-XII grossly intact.     Patient Active Problem List   Diagnosis    Angina pectoris (CMS/HCC)    Arthritis of knee    Cardiac defibrillator in place    Cardiomyopathy (CMS/HCC)    Cat scratch    Chronic right shoulder pain    Atherosclerotic heart disease of native coronary artery with other forms of angina pectoris (CMS/HCC)    Coronary artery disease    Depression    Eosinophilia    Hypertension    Hearing loss    Heart failure (CMS/HCC)    Hordeolum externum of left eye    Hyperlipidemia    Mixed hyperlipidemia    Hypothyroidism    Insufficient sleep syndrome    Morbid obesity (CMS/HCC)    Obstructive sleep apnea    Sciatica of right side associated with disorder of lumbar spine    Shortness of breath    Sleep-related hypoventilation due to medical condition    Type 2 diabetes mellitus (CMS/HCC)    Ventricular fibrillation (CMS/HCC)    " Ventricular tachycardia (CMS/HCC)    Falls    Leukocytosis    Sensorineural hearing loss (SNHL) of both ears       Problem List Items Addressed This Visit          Cardiac and Vasculature    Angina pectoris (CMS/HCC)    Cardiomyopathy (CMS/HCC) - Primary    Relevant Orders    ECG 12 Lead    Atherosclerotic heart disease of native coronary artery with other forms of angina pectoris (CMS/HCC)    Hypertension    Heart failure (CMS/HCC)    Hyperlipidemia      Patient with a history of diastolic CHF.  Status post perm pacemaker upcoming ventricular lead revision  No chest pain or tightness.  Here for follow-up patient currently on lisinopril, torsemide, spironolactone.  Also on carvedilol.  Modify risk factor.  Advised patient to avoid lunch meats, canned soups, pizzas, bread rolls, and sandwiches. Advised patient to limit salt intake 1,500 mg daily. Advised patient to exercise 30 mins/3 times a week including treadmill or aerobic type, Goal to achieve 65% target HR.  Diet and exercise reviewed with patient..advice to walk about 10,000 steps or about 2 hours during day time. Cut back on salt, sugar and flour.  Advised patient to check blood pressure twice a day for next 10 days and give us a call if her blood pressure remains above 170 systolic and diastolic above 95.  Meanwhile cut back on salt, caffeine.  If blood pressure persistently stays above 200 systolic then go to nearest emergency room or call 911.    Mesfin Johnson MD

## 2023-12-05 DIAGNOSIS — I42.9 CARDIOMYOPATHY, UNSPECIFIED TYPE (MULTI): ICD-10-CM

## 2023-12-05 DIAGNOSIS — Z01.818 PREOP TESTING: ICD-10-CM

## 2023-12-11 ENCOUNTER — LAB (OUTPATIENT)
Dept: LAB | Facility: LAB | Age: 73
End: 2023-12-11
Payer: MEDICARE

## 2023-12-11 DIAGNOSIS — Z01.818 PREOP TESTING: ICD-10-CM

## 2023-12-11 DIAGNOSIS — I42.9 CARDIOMYOPATHY, UNSPECIFIED TYPE (MULTI): ICD-10-CM

## 2023-12-11 LAB
ANION GAP SERPL CALC-SCNC: 14 MMOL/L (ref 10–20)
BUN SERPL-MCNC: 22 MG/DL (ref 6–23)
CALCIUM SERPL-MCNC: 9.6 MG/DL (ref 8.6–10.3)
CHLORIDE SERPL-SCNC: 101 MMOL/L (ref 98–107)
CO2 SERPL-SCNC: 30 MMOL/L (ref 21–32)
CREAT SERPL-MCNC: 1 MG/DL (ref 0.5–1.05)
ERYTHROCYTE [DISTWIDTH] IN BLOOD BY AUTOMATED COUNT: 13 % (ref 11.5–14.5)
GFR SERPL CREATININE-BSD FRML MDRD: 60 ML/MIN/1.73M*2
GLUCOSE SERPL-MCNC: 155 MG/DL (ref 74–99)
HCT VFR BLD AUTO: 43.5 % (ref 36–46)
HGB BLD-MCNC: 14.1 G/DL (ref 12–16)
INR PPP: 1.1 (ref 0.9–1.1)
MCH RBC QN AUTO: 29.7 PG (ref 26–34)
MCHC RBC AUTO-ENTMCNC: 32.4 G/DL (ref 32–36)
MCV RBC AUTO: 92 FL (ref 80–100)
NRBC BLD-RTO: 0 /100 WBCS (ref 0–0)
PLATELET # BLD AUTO: 251 X10*3/UL (ref 150–450)
POTASSIUM SERPL-SCNC: 4.4 MMOL/L (ref 3.5–5.3)
PROTHROMBIN TIME: 12.2 SECONDS (ref 9.8–12.8)
RBC # BLD AUTO: 4.75 X10*6/UL (ref 4–5.2)
SODIUM SERPL-SCNC: 141 MMOL/L (ref 136–145)
WBC # BLD AUTO: 12.2 X10*3/UL (ref 4.4–11.3)

## 2023-12-11 PROCEDURE — 36415 COLL VENOUS BLD VENIPUNCTURE: CPT

## 2023-12-11 PROCEDURE — 85610 PROTHROMBIN TIME: CPT

## 2023-12-11 PROCEDURE — 80048 BASIC METABOLIC PNL TOTAL CA: CPT

## 2023-12-11 PROCEDURE — 85027 COMPLETE CBC AUTOMATED: CPT

## 2023-12-15 ENCOUNTER — HOSPITAL ENCOUNTER (OUTPATIENT)
Facility: HOSPITAL | Age: 73
LOS: 1 days | Discharge: HOME | End: 2023-12-16
Attending: INTERNAL MEDICINE | Admitting: INTERNAL MEDICINE
Payer: MEDICARE

## 2023-12-15 ENCOUNTER — APPOINTMENT (OUTPATIENT)
Dept: CARDIOLOGY | Facility: HOSPITAL | Age: 73
End: 2023-12-15
Payer: MEDICARE

## 2023-12-15 ENCOUNTER — ANESTHESIA (OUTPATIENT)
Dept: CARDIOLOGY | Facility: HOSPITAL | Age: 73
End: 2023-12-15
Payer: MEDICARE

## 2023-12-15 ENCOUNTER — ANESTHESIA EVENT (OUTPATIENT)
Dept: CARDIOLOGY | Facility: HOSPITAL | Age: 73
End: 2023-12-15
Payer: MEDICARE

## 2023-12-15 ENCOUNTER — APPOINTMENT (OUTPATIENT)
Dept: RADIOLOGY | Facility: HOSPITAL | Age: 73
End: 2023-12-15
Payer: MEDICARE

## 2023-12-15 DIAGNOSIS — I42.0 DILATED CARDIOMYOPATHY (MULTI): ICD-10-CM

## 2023-12-15 DIAGNOSIS — I47.20 VENTRICULAR TACHYCARDIA (MULTI): Primary | ICD-10-CM

## 2023-12-15 DIAGNOSIS — Z95.810 CARDIAC DEFIBRILLATOR IN PLACE: ICD-10-CM

## 2023-12-15 LAB
GLUCOSE BLD MANUAL STRIP-MCNC: 137 MG/DL (ref 74–99)
GLUCOSE BLD MANUAL STRIP-MCNC: 99 MG/DL (ref 74–99)

## 2023-12-15 PROCEDURE — 71045 X-RAY EXAM CHEST 1 VIEW: CPT | Performed by: STUDENT IN AN ORGANIZED HEALTH CARE EDUCATION/TRAINING PROGRAM

## 2023-12-15 PROCEDURE — C1889 IMPLANT/INSERT DEVICE, NOC: HCPCS | Performed by: INTERNAL MEDICINE

## 2023-12-15 PROCEDURE — 2500000004 HC RX 250 GENERAL PHARMACY W/ HCPCS (ALT 636 FOR OP/ED): Performed by: INTERNAL MEDICINE

## 2023-12-15 PROCEDURE — 3700000001 HC GENERAL ANESTHESIA TIME - INITIAL BASE CHARGE: Performed by: INTERNAL MEDICINE

## 2023-12-15 PROCEDURE — 33241 REMOVE PULSE GENERATOR: CPT | Performed by: INTERNAL MEDICINE

## 2023-12-15 PROCEDURE — 7100000009 HC PHASE TWO TIME - INITIAL BASE CHARGE: Performed by: INTERNAL MEDICINE

## 2023-12-15 PROCEDURE — 33249 INSJ/RPLCMT DEFIB W/LEAD(S): CPT | Performed by: INTERNAL MEDICINE

## 2023-12-15 PROCEDURE — 2500000005 HC RX 250 GENERAL PHARMACY W/O HCPCS: Performed by: NURSE ANESTHETIST, CERTIFIED REGISTERED

## 2023-12-15 PROCEDURE — C1769 GUIDE WIRE: HCPCS | Performed by: INTERNAL MEDICINE

## 2023-12-15 PROCEDURE — 2720000007 HC OR 272 NO HCPCS: Performed by: INTERNAL MEDICINE

## 2023-12-15 PROCEDURE — 7100000010 HC PHASE TWO TIME - EACH INCREMENTAL 1 MINUTE: Performed by: INTERNAL MEDICINE

## 2023-12-15 PROCEDURE — 71045 X-RAY EXAM CHEST 1 VIEW: CPT

## 2023-12-15 PROCEDURE — A33249 PR INSERT ELECTRD LEADS/REPOSTION: Performed by: ANESTHESIOLOGIST ASSISTANT

## 2023-12-15 PROCEDURE — C1898 LEAD, PMKR, OTHER THAN TRANS: HCPCS | Performed by: INTERNAL MEDICINE

## 2023-12-15 PROCEDURE — 2500000004 HC RX 250 GENERAL PHARMACY W/ HCPCS (ALT 636 FOR OP/ED): Performed by: NURSE ANESTHETIST, CERTIFIED REGISTERED

## 2023-12-15 PROCEDURE — A33249 PR INSERT ELECTRD LEADS/REPOSTION: Performed by: STUDENT IN AN ORGANIZED HEALTH CARE EDUCATION/TRAINING PROGRAM

## 2023-12-15 PROCEDURE — 1100000001 HC PRIVATE ROOM DAILY

## 2023-12-15 PROCEDURE — 2500000002 HC RX 250 W HCPCS SELF ADMINISTERED DRUGS (ALT 637 FOR MEDICARE OP, ALT 636 FOR OP/ED): Mod: MUE | Performed by: INTERNAL MEDICINE

## 2023-12-15 PROCEDURE — 82947 ASSAY GLUCOSE BLOOD QUANT: CPT

## 2023-12-15 PROCEDURE — 2780000003 HC OR 278 NO HCPCS: Performed by: INTERNAL MEDICINE

## 2023-12-15 PROCEDURE — 3700000002 HC GENERAL ANESTHESIA TIME - EACH INCREMENTAL 1 MINUTE: Performed by: INTERNAL MEDICINE

## 2023-12-15 PROCEDURE — 99100 ANES PT EXTEME AGE<1 YR&>70: CPT | Performed by: STUDENT IN AN ORGANIZED HEALTH CARE EDUCATION/TRAINING PROGRAM

## 2023-12-15 PROCEDURE — 2500000001 HC RX 250 WO HCPCS SELF ADMINISTERED DRUGS (ALT 637 FOR MEDICARE OP): Performed by: INTERNAL MEDICINE

## 2023-12-15 PROCEDURE — C1894 INTRO/SHEATH, NON-LASER: HCPCS | Performed by: INTERNAL MEDICINE

## 2023-12-15 PROCEDURE — 2500000004 HC RX 250 GENERAL PHARMACY W/ HCPCS (ALT 636 FOR OP/ED): Performed by: ANESTHESIOLOGIST ASSISTANT

## 2023-12-15 DEVICE — CARDIAC RESYNCHRONIZATION DEVICE, TIERED-THERAPY CARDIOVERTER/DEFIBRILLATOR VVED DDDRV
Type: IMPLANTABLE DEVICE | Status: FUNCTIONAL
Brand: UNIFY ASSURA™

## 2023-12-15 DEVICE — PACING LEAD
Type: IMPLANTABLE DEVICE | Status: FUNCTIONAL
Brand: TENDRIL™

## 2023-12-15 RX ORDER — LANOLIN ALCOHOL/MO/W.PET/CERES
400 CREAM (GRAM) TOPICAL DAILY
Status: DISCONTINUED | OUTPATIENT
Start: 2023-12-16 | End: 2023-12-16 | Stop reason: HOSPADM

## 2023-12-15 RX ORDER — LISINOPRIL 2.5 MG/1
2.5 TABLET ORAL DAILY
Status: DISCONTINUED | OUTPATIENT
Start: 2023-12-16 | End: 2023-12-16 | Stop reason: HOSPADM

## 2023-12-15 RX ORDER — BUPIVACAINE HYDROCHLORIDE 5 MG/ML
INJECTION, SOLUTION EPIDURAL; INTRACAUDAL AS NEEDED
Status: DISCONTINUED | OUTPATIENT
Start: 2023-12-15 | End: 2023-12-16 | Stop reason: HOSPADM

## 2023-12-15 RX ORDER — CHOLECALCIFEROL (VITAMIN D3) 50 MCG
50 TABLET ORAL DAILY
COMMUNITY

## 2023-12-15 RX ORDER — DULOXETIN HYDROCHLORIDE 60 MG/1
60 CAPSULE, DELAYED RELEASE ORAL DAILY
Status: DISCONTINUED | OUTPATIENT
Start: 2023-12-15 | End: 2023-12-16 | Stop reason: HOSPADM

## 2023-12-15 RX ORDER — ATORVASTATIN CALCIUM 40 MG/1
40 TABLET, FILM COATED ORAL DAILY
Status: DISCONTINUED | OUTPATIENT
Start: 2023-12-15 | End: 2023-12-16 | Stop reason: HOSPADM

## 2023-12-15 RX ORDER — CALCIUM CARBONATE 500(1250)
1250 TABLET ORAL
Status: DISCONTINUED | OUTPATIENT
Start: 2023-12-16 | End: 2023-12-16 | Stop reason: HOSPADM

## 2023-12-15 RX ORDER — FENTANYL CITRATE 50 UG/ML
INJECTION, SOLUTION INTRAMUSCULAR; INTRAVENOUS AS NEEDED
Status: DISCONTINUED | OUTPATIENT
Start: 2023-12-15 | End: 2023-12-15

## 2023-12-15 RX ORDER — CHOLECALCIFEROL (VITAMIN D3) 25 MCG
2000 TABLET ORAL DAILY
Status: DISCONTINUED | OUTPATIENT
Start: 2023-12-15 | End: 2023-12-16 | Stop reason: HOSPADM

## 2023-12-15 RX ORDER — CARVEDILOL 25 MG/1
25 TABLET ORAL
Status: DISCONTINUED | OUTPATIENT
Start: 2023-12-16 | End: 2023-12-16 | Stop reason: HOSPADM

## 2023-12-15 RX ORDER — BACLOFEN 20 MG
1 TABLET ORAL DAILY
COMMUNITY

## 2023-12-15 RX ORDER — ACETAMINOPHEN 160 MG/5ML
650 SOLUTION ORAL EVERY 4 HOURS PRN
Status: DISCONTINUED | OUTPATIENT
Start: 2023-12-15 | End: 2023-12-16 | Stop reason: HOSPADM

## 2023-12-15 RX ORDER — ACETAMINOPHEN 650 MG/1
650 SUPPOSITORY RECTAL EVERY 4 HOURS PRN
Status: DISCONTINUED | OUTPATIENT
Start: 2023-12-15 | End: 2023-12-16 | Stop reason: HOSPADM

## 2023-12-15 RX ORDER — PROPOFOL 10 MG/ML
INJECTION, EMULSION INTRAVENOUS CONTINUOUS PRN
Status: DISCONTINUED | OUTPATIENT
Start: 2023-12-15 | End: 2023-12-15

## 2023-12-15 RX ORDER — CEPHALEXIN 500 MG/1
500 CAPSULE ORAL EVERY 8 HOURS SCHEDULED
Status: DISCONTINUED | OUTPATIENT
Start: 2023-12-15 | End: 2023-12-16 | Stop reason: HOSPADM

## 2023-12-15 RX ORDER — SODIUM CHLORIDE, SODIUM LACTATE, POTASSIUM CHLORIDE, CALCIUM CHLORIDE 600; 310; 30; 20 MG/100ML; MG/100ML; MG/100ML; MG/100ML
INJECTION, SOLUTION INTRAVENOUS CONTINUOUS PRN
Status: DISCONTINUED | OUTPATIENT
Start: 2023-12-15 | End: 2023-12-15

## 2023-12-15 RX ORDER — LIDOCAINE HYDROCHLORIDE 20 MG/ML
INJECTION, SOLUTION EPIDURAL; INFILTRATION; INTRACAUDAL; PERINEURAL AS NEEDED
Status: DISCONTINUED | OUTPATIENT
Start: 2023-12-15 | End: 2023-12-15

## 2023-12-15 RX ORDER — PROPOFOL 10 MG/ML
INJECTION, EMULSION INTRAVENOUS AS NEEDED
Status: DISCONTINUED | OUTPATIENT
Start: 2023-12-15 | End: 2023-12-15

## 2023-12-15 RX ORDER — TORSEMIDE 20 MG/1
20 TABLET ORAL DAILY
Status: DISCONTINUED | OUTPATIENT
Start: 2023-12-16 | End: 2023-12-16 | Stop reason: HOSPADM

## 2023-12-15 RX ORDER — BACLOFEN 20 MG
1 TABLET ORAL DAILY
Status: DISCONTINUED | OUTPATIENT
Start: 2023-12-15 | End: 2023-12-15

## 2023-12-15 RX ORDER — ACETAMINOPHEN 325 MG/1
650 TABLET ORAL EVERY 4 HOURS PRN
Status: DISCONTINUED | OUTPATIENT
Start: 2023-12-15 | End: 2023-12-16 | Stop reason: HOSPADM

## 2023-12-15 RX ORDER — IBUPROFEN 200 MG
400 CAPSULE ORAL 2 TIMES DAILY
Status: DISCONTINUED | OUTPATIENT
Start: 2023-12-15 | End: 2023-12-15

## 2023-12-15 RX ORDER — CEFAZOLIN SODIUM 2 G/100ML
INJECTION, SOLUTION INTRAVENOUS AS NEEDED
Status: DISCONTINUED | OUTPATIENT
Start: 2023-12-15 | End: 2023-12-15

## 2023-12-15 RX ORDER — SPIRONOLACTONE 25 MG/1
12.5 TABLET ORAL DAILY
Status: DISCONTINUED | OUTPATIENT
Start: 2023-12-16 | End: 2023-12-16 | Stop reason: HOSPADM

## 2023-12-15 RX ORDER — VANCOMYCIN HYDROCHLORIDE 1 G/200ML
INJECTION, SOLUTION INTRAVENOUS AS NEEDED
Status: DISCONTINUED | OUTPATIENT
Start: 2023-12-15 | End: 2023-12-15

## 2023-12-15 RX ADMIN — DULOXETINE HYDROCHLORIDE 60 MG: 60 CAPSULE, DELAYED RELEASE ORAL at 22:54

## 2023-12-15 RX ADMIN — LIDOCAINE HYDROCHLORIDE 100 MG: 20 INJECTION, SOLUTION EPIDURAL; INFILTRATION; INTRACAUDAL; PERINEURAL at 14:41

## 2023-12-15 RX ADMIN — PROPOFOL 30 MG: 10 INJECTION, EMULSION INTRAVENOUS at 14:41

## 2023-12-15 RX ADMIN — PROPOFOL 100 MCG/KG/MIN: 10 INJECTION, EMULSION INTRAVENOUS at 14:41

## 2023-12-15 RX ADMIN — VANCOMYCIN HYDROCHLORIDE 1.5 G: 1 INJECTION, SOLUTION INTRAVENOUS at 14:41

## 2023-12-15 RX ADMIN — CEPHALEXIN 500 MG: 500 CAPSULE ORAL at 22:07

## 2023-12-15 RX ADMIN — CEFAZOLIN SODIUM 2 G: 2 INJECTION, SOLUTION INTRAVENOUS at 14:41

## 2023-12-15 RX ADMIN — ATORVASTATIN CALCIUM 40 MG: 40 TABLET, FILM COATED ORAL at 22:07

## 2023-12-15 RX ADMIN — PROPOFOL 20 MG: 10 INJECTION, EMULSION INTRAVENOUS at 14:43

## 2023-12-15 RX ADMIN — FENTANYL CITRATE 50 MCG: 50 INJECTION, SOLUTION INTRAMUSCULAR; INTRAVENOUS at 14:57

## 2023-12-15 RX ADMIN — SODIUM CHLORIDE, POTASSIUM CHLORIDE, SODIUM LACTATE AND CALCIUM CHLORIDE: 600; 310; 30; 20 INJECTION, SOLUTION INTRAVENOUS at 14:38

## 2023-12-15 SDOH — HEALTH STABILITY: MENTAL HEALTH: HOW OFTEN DO YOU HAVE 6 OR MORE DRINKS ON ONE OCCASION?: NEVER

## 2023-12-15 SDOH — ECONOMIC STABILITY: HOUSING INSECURITY: IN THE LAST 12 MONTHS, HOW MANY PLACES HAVE YOU LIVED?: 1

## 2023-12-15 SDOH — ECONOMIC STABILITY: INCOME INSECURITY: IN THE LAST 12 MONTHS, WAS THERE A TIME WHEN YOU WERE NOT ABLE TO PAY THE MORTGAGE OR RENT ON TIME?: NO

## 2023-12-15 SDOH — ECONOMIC STABILITY: INCOME INSECURITY: HOW HARD IS IT FOR YOU TO PAY FOR THE VERY BASICS LIKE FOOD, HOUSING, MEDICAL CARE, AND HEATING?: NOT HARD AT ALL

## 2023-12-15 SDOH — ECONOMIC STABILITY: TRANSPORTATION INSECURITY
IN THE PAST 12 MONTHS, HAS THE LACK OF TRANSPORTATION KEPT YOU FROM MEDICAL APPOINTMENTS OR FROM GETTING MEDICATIONS?: NO

## 2023-12-15 SDOH — HEALTH STABILITY: MENTAL HEALTH: HOW OFTEN DO YOU HAVE A DRINK CONTAINING ALCOHOL?: NEVER

## 2023-12-15 SDOH — ECONOMIC STABILITY: TRANSPORTATION INSECURITY
IN THE PAST 12 MONTHS, HAS LACK OF TRANSPORTATION KEPT YOU FROM MEETINGS, WORK, OR FROM GETTING THINGS NEEDED FOR DAILY LIVING?: NO

## 2023-12-15 SDOH — ECONOMIC STABILITY: HOUSING INSECURITY
IN THE LAST 12 MONTHS, WAS THERE A TIME WHEN YOU DID NOT HAVE A STEADY PLACE TO SLEEP OR SLEPT IN A SHELTER (INCLUDING NOW)?: NO

## 2023-12-15 SDOH — HEALTH STABILITY: MENTAL HEALTH: HOW MANY STANDARD DRINKS CONTAINING ALCOHOL DO YOU HAVE ON A TYPICAL DAY?: PATIENT DOES NOT DRINK

## 2023-12-15 ASSESSMENT — COGNITIVE AND FUNCTIONAL STATUS - GENERAL
DAILY ACTIVITIY SCORE: 24
MOBILITY SCORE: 24
PATIENT BASELINE BEDBOUND: NO
DAILY ACTIVITIY SCORE: 24
MOBILITY SCORE: 24

## 2023-12-15 ASSESSMENT — ACTIVITIES OF DAILY LIVING (ADL)
TOILETING: INDEPENDENT
HEARING - LEFT EAR: HEARING AID
WALKS IN HOME: INDEPENDENT
DRESSING YOURSELF: INDEPENDENT
FEEDING YOURSELF: INDEPENDENT
GROOMING: INDEPENDENT
JUDGMENT_ADEQUATE_SAFELY_COMPLETE_DAILY_ACTIVITIES: YES
HEARING - RIGHT EAR: HEARING AID
ADEQUATE_TO_COMPLETE_ADL: YES
BATHING: INDEPENDENT
PATIENT'S MEMORY ADEQUATE TO SAFELY COMPLETE DAILY ACTIVITIES?: YES

## 2023-12-15 ASSESSMENT — LIFESTYLE VARIABLES
SKIP TO QUESTIONS 9-10: 1
AUDIT-C TOTAL SCORE: 0

## 2023-12-15 ASSESSMENT — COLUMBIA-SUICIDE SEVERITY RATING SCALE - C-SSRS
1. IN THE PAST MONTH, HAVE YOU WISHED YOU WERE DEAD OR WISHED YOU COULD GO TO SLEEP AND NOT WAKE UP?: NO
2. HAVE YOU ACTUALLY HAD ANY THOUGHTS OF KILLING YOURSELF?: NO
6. HAVE YOU EVER DONE ANYTHING, STARTED TO DO ANYTHING, OR PREPARED TO DO ANYTHING TO END YOUR LIFE?: NO

## 2023-12-15 NOTE — PROGRESS NOTES
Pharmacy Medication History Review    Ndaeen Nguyễn is a 73 y.o. female admitted for No Principal Problem: There is no principal problem currently on the Problem List. Please update the Problem List and refresh.. Pharmacy reviewed the patient's [id number] medications and allergies for accuracy.    The list below reflects the updated PTA list.   Comments regarding how patient may be taking medications differently can be found in the Admit Orders Activity  Prior to Admission Medications   Prescriptions Last Dose Informant Patient Reported?   DULoxetine (Cymbalta) 60 mg DR capsule 12/14/2023 Self No   Sig: TAKE 1 CAPSULE DAILY   SITagliptin phos-metformin (Janumet)  mg tablet 12/14/2023 Self No   Sig: Take 1 tablet by mouth once daily.   aspirin 81 mg EC tablet 12/8/2023 Self Yes   Sig: Take 1 tablet (81 mg) by mouth once daily.   atorvastatin (Lipitor) 40 mg tablet 12/14/2023 Self No   Sig: TAKE 1 TABLET DAILY   calcium citrate (Calcitrate) 200 mg (950 mg) tablet 12/14/2023 Self Yes   Sig: Take 2 tablets (400 mg) by mouth 2 times a day.   carvedilol (Coreg) 25 mg tablet 12/14/2023 Self Yes   Sig: Take 1 tablet (25 mg) by mouth 2 times a day with meals.   cholecalciferol (Vitamin D-3) 50 MCG (2000 UT) tablet 12/14/2023 Self Yes   Sig: Take 1 tablet (50 mcg) by mouth once daily.   coenzyme Q-10 100 mg capsule 12/14/2023 Self Yes   Sig: Take 2 capsules (200 mg) by mouth once daily.   levothyroxine (Synthroid, Levoxyl) 137 mcg tablet 12/14/2023 Self No   Sig: TAKE 1 TABLET DAILY   lisinopril 2.5 mg tablet 12/14/2023 Self No   Sig: TAKE 1 TABLET DAILY   magnesium oxide 500 mg tablet 12/14/2023 Self Yes   Sig: Take 1 tablet (500 mg) by mouth once daily.   multivitamin-iron-minerals-folic acid (Multivitamin 50 Plus) tablet 12/14/2023 Self Yes   Sig: Take 1 tablet by mouth once daily.   spironolactone (Aldactone) 25 mg tablet 12/14/2023 Self Yes   Sig: Take 0.5 tablets (12.5 mg) by mouth once daily.   torsemide  "(Demadex) 20 mg tablet 12/14/2023 Self Yes   Sig: Take 1 tablet (20 mg) by mouth once daily.      Facility-Administered Medications: None        The list below reflects the updated allergy list. Please review each documented allergy for additional clarification and justification.  Allergies  Reviewed by Todd Bellamy [facility] on 12/15/2023        Severity Reactions Comments    Azithromycin Not Specified Other, Unknown     Erythromycin Not Specified Unknown     Nsaids (non-steroidal Anti-inflammatory Drug) Not Specified Unknown             M2B service not offered prior to surgery, please reassess prior to patient discharge if Meds to Beds is desired.   Local Rx: [facility] [address] in [place]  Mail order: [facility]     Sources:   Pt interview (with med list including dosage and frequency for supplements and vitamins).  Dispense Hx  BRITTANYS         Todd Bellamy PharmSANT[facility]  Transitions of Care Pharmacist    Available on Epic Chat  If no response call [phone] or [facility] \"Med Rec\"   "

## 2023-12-15 NOTE — ANESTHESIA PREPROCEDURE EVALUATION
Patient: Nadeen Nguyễn    Procedure Information       Anesthesia Start Date/Time: 12/15/23 1044    Procedure: ICD UPGRADE BIV - [id number] upgrade to BIV/ICD from dual chamber ICD [facility]    [place] [facility] [place] [facility] [facility] Cath Lab    Providers: Anselmo Lilly MD            Relevant Problems   Cardiovascular   (+) Angina pectoris (CMS/HCC)   (+) Atherosclerotic heart disease of native coronary artery with other forms of angina pectoris (CMS/HCC)   (+) Coronary artery disease   (+) Heart failure (CMS/HCC)   (+) Hyperlipidemia   (+) Hypertension   (+) Mixed hyperlipidemia   (+) Ventricular fibrillation (CMS/HCC)   (+) Ventricular tachycardia (CMS/HCC)      Endocrine   (+) Hypothyroidism   (+) Morbid obesity (CMS/HCC)   (+) Type 2 diabetes mellitus (CMS/HCC)      Neuro/Psych   (+) Depression   (+) Sciatica of right side associated with disorder of lumbar spine      Pulmonary   (+) Obstructive sleep apnea      Eyes, Ears, Nose, and Throat   (+) Hearing loss   (+) Sensorineural hearing loss (SNHL) of both ears      Other   (+) Arthritis of knee       Clinical information reviewed:    Allergies                NPO Detail:  NPO/Void Status  Date of Last Liquid: 12/14/23  Time of Last Liquid: 2359  Date of Last Solid: 12/14/23  Time of Last Solid: 2359         Physical Exam    Airway  Mallampati: II  TM distance: <3 FB  Neck ROM: full     Cardiovascular - normal exam  Rhythm: regular     Dental    Pulmonary - normal exam     Abdominal            Anesthesia Plan    ASA 3     MAC     intravenous induction   Trial extubation is planned.  Anesthetic plan and risks discussed with patient.    Plan discussed with CRNA.

## 2023-12-15 NOTE — H&P
History Of Present Illness  Nadeen Nguyễn is a 73 y.o. female presenting with  HF  s/p CRTD  presented for RV lead revesion for possible micro perforation, with possible diaphragmatic pacing.      Past Medical History  Past Medical History:   Diagnosis Date    Angina pectoris (CMS/[facility]) 08/15/2023    Atherosclerotic heart disease of native coronary artery with other forms of angina pectoris (CMS/[facility]) 08/15/2023    Cardiac defibrillator in place 08/15/2023    Cardiomyopathy (CMS/[facility]) 08/15/2023    Encounter for screening mammogram for malignant neoplasm of breast 04/11/2016    Visit for screening mammogram    Heart failure (CMS/[facility]) 08/15/2023    Hyperlipidemia 08/15/2023    Hypertension 08/15/2023    Hypothyroidism 08/15/2023    Obstructive sleep apnea 08/15/2023    Personal history of other diseases of the nervous system and sense organs     History of sleep apnea    Spinal stenosis, lumbar region without neurogenic claudication     Lumbar canal stenosis    Spondylosis without myelopathy or radiculopathy, cervical region 10/29/2015    Spondylosis of cervical region without myelopathy or radiculopathy    Type 2 diabetes mellitus (CMS/[facility]) 08/15/2023    Ventricular fibrillation (CMS/[facility]) 08/15/2023    Ventricular tachycardia (CMS/[facility]) 08/15/2023       Surgical History  Past Surgical History:   Procedure Laterality Date    ABLATION OF DYSRHYTHMIC FOCUS      CARDIAC CATHETERIZATION      CARDIAC PACEMAKER PLACEMENT  04/11/2016    Pacemaker Placement    CARDIAC PACEMAKER PLACEMENT  06/14/2013    Pacemaker Placement    CHOLECYSTECTOMY  03/21/2013    Cholecystectomy    COLONOSCOPY  10/09/2019    Complete Colonoscopy    CORONARY STENT PLACEMENT      GALLBLADDER SURGERY  06/18/2013    Gallbladder Surgery    OTHER SURGICAL HISTORY  11/22/2013    Gastric Surgery For Morbid Obesity Laparoscopic Longitudinal Gastrectomy    TONSILLECTOMY  06/18/2013    Tonsillectomy        Social History  She reports that she has never smoked. She  has never been exposed to tobacco smoke. She has never used smokeless tobacco. She reports that she does not drink alcohol and does not use drugs.    Family History  Family History   Problem Relation Name Age of Onset    Colon cancer Mother Lisa Nguyễn     Hypertension Mother Lisa Nguyễn     Diabetes Mother Lisa Nguyễn     Cancer Mother Lisa Nguyễn     Stroke Father Navneet Nguyễn     Heart disease Father Navneet Nguyễn     Diabetes type II Maternal Grandmother eMlissa Nunez     Diabetes type II Mother's Sister Raquel Mckeonn     Heart attack Mother's Brother Juan Nunez         Allergies  Azithromycin, Erythromycin, and Nsaids (non-steroidal anti-inflammatory drug)    Review of Systems   All other systems reviewed and are negative.       Physical Exam  Constitutional:       Appearance: Normal appearance.   Cardiovascular:      Rate and Rhythm: Normal rate and regular rhythm.      Pulses: Normal pulses.      Heart sounds: Normal heart sounds.   Pulmonary:      Effort: Pulmonary effort is normal.      Breath sounds: Normal breath sounds.   Neurological:      Mental Status: She is alert.          Last Recorded Vitals  There were no vitals taken for this visit.    Relevant Results             Assessment/Plan   Active Problems:  There are no active Hospital Problems.           I spent 30 minutes in the professional and overall care of this patient.      Reinaldo Hodge MD

## 2023-12-16 ENCOUNTER — APPOINTMENT (OUTPATIENT)
Dept: RADIOLOGY | Facility: HOSPITAL | Age: 73
End: 2023-12-16
Payer: MEDICARE

## 2023-12-16 VITALS
RESPIRATION RATE: 17 BRPM | BODY MASS INDEX: 45.54 KG/M2 | DIASTOLIC BLOOD PRESSURE: 63 MMHG | TEMPERATURE: 97.9 F | SYSTOLIC BLOOD PRESSURE: 98 MMHG | WEIGHT: 241 LBS | HEART RATE: 81 BPM | OXYGEN SATURATION: 94 %

## 2023-12-16 LAB
GLUCOSE BLD MANUAL STRIP-MCNC: 147 MG/DL (ref 74–99)
GLUCOSE BLD MANUAL STRIP-MCNC: 193 MG/DL (ref 74–99)

## 2023-12-16 PROCEDURE — 2500000004 HC RX 250 GENERAL PHARMACY W/ HCPCS (ALT 636 FOR OP/ED): Performed by: STUDENT IN AN ORGANIZED HEALTH CARE EDUCATION/TRAINING PROGRAM

## 2023-12-16 PROCEDURE — 2500000001 HC RX 250 WO HCPCS SELF ADMINISTERED DRUGS (ALT 637 FOR MEDICARE OP): Performed by: STUDENT IN AN ORGANIZED HEALTH CARE EDUCATION/TRAINING PROGRAM

## 2023-12-16 PROCEDURE — 2500000001 HC RX 250 WO HCPCS SELF ADMINISTERED DRUGS (ALT 637 FOR MEDICARE OP): Performed by: INTERNAL MEDICINE

## 2023-12-16 PROCEDURE — 71046 X-RAY EXAM CHEST 2 VIEWS: CPT

## 2023-12-16 PROCEDURE — 2500000002 HC RX 250 W HCPCS SELF ADMINISTERED DRUGS (ALT 637 FOR MEDICARE OP, ALT 636 FOR OP/ED): Mod: MUE | Performed by: INTERNAL MEDICINE

## 2023-12-16 PROCEDURE — 7100000011 HC EXTENDED STAY RECOVERY HOURLY - NURSING UNIT

## 2023-12-16 PROCEDURE — 2500000004 HC RX 250 GENERAL PHARMACY W/ HCPCS (ALT 636 FOR OP/ED): Mod: MUE | Performed by: INTERNAL MEDICINE

## 2023-12-16 PROCEDURE — 71046 X-RAY EXAM CHEST 2 VIEWS: CPT | Performed by: STUDENT IN AN ORGANIZED HEALTH CARE EDUCATION/TRAINING PROGRAM

## 2023-12-16 PROCEDURE — 82947 ASSAY GLUCOSE BLOOD QUANT: CPT

## 2023-12-16 RX ORDER — CEPHALEXIN 500 MG/1
500 CAPSULE ORAL EVERY 8 HOURS SCHEDULED
Qty: 21 CAPSULE | Refills: 0 | Status: SHIPPED | OUTPATIENT
Start: 2023-12-16 | End: 2023-12-23

## 2023-12-16 RX ADMIN — CEPHALEXIN 500 MG: 500 CAPSULE ORAL at 13:08

## 2023-12-16 RX ADMIN — LEVOTHYROXINE SODIUM 137 MCG: 112 TABLET ORAL at 10:34

## 2023-12-16 RX ADMIN — Medication 1 TABLET: at 10:35

## 2023-12-16 RX ADMIN — CARVEDILOL 25 MG: 25 TABLET, FILM COATED ORAL at 10:33

## 2023-12-16 RX ADMIN — DULOXETINE HYDROCHLORIDE 60 MG: 60 CAPSULE, DELAYED RELEASE ORAL at 10:37

## 2023-12-16 RX ADMIN — CALCIUM 1250 MG: 500 TABLET ORAL at 10:35

## 2023-12-16 RX ADMIN — TORSEMIDE 20 MG: 20 TABLET ORAL at 10:35

## 2023-12-16 RX ADMIN — LISINOPRIL 2.5 MG: 2.5 TABLET ORAL at 10:34

## 2023-12-16 RX ADMIN — SPIRONOLACTONE 12.5 MG: 25 TABLET, FILM COATED ORAL at 10:33

## 2023-12-16 RX ADMIN — Medication 400 MG: at 10:34

## 2023-12-16 RX ADMIN — CEPHALEXIN 500 MG: 500 CAPSULE ORAL at 06:26

## 2023-12-16 NOTE — ANESTHESIA POSTPROCEDURE EVALUATION
Patient: Nadeen Nguyễn    Procedure Summary       Date: 12/15/23 [place] [facility] [place] [facility] [place] [facility]29 Cardiac Cath Lab    Anesthesia Start: 1412 Anesthesia Stop: 1716    Procedure: ICD UPGRADE BIV Diagnosis:       Dilated cardiomyopathy (CMS/HCC)      (Unsuccessful LV lead extraction  Successful LBP lead placment)    Providers: Anselmo Lilly MD Responsible Provider: HATTIE Ny    Anesthesia Type: MAC ASA Status: 3          Vitals:    12/16/23 0557   BP: 124/81   Pulse: 86   Resp: 18   Temp: 36.1 °C (97 °F)   SpO2: 93%       Anesthesia Type: MAC    Vitals Value Taken Time   /81 12/16/23 0633   Temp 36.1 12/16/23 0633   Pulse 86 12/16/23 0633   Resp 18 12/16/23 0633   SpO2 93 12/16/23 0633       Anesthesia Post Evaluation    Patient participation: complete - patient participated  Level of consciousness: awake  Pain management: adequate  Airway patency: patent  Cardiovascular status: acceptable  Respiratory status: acceptable  Hydration status: acceptable  Postoperative Nausea and Vomiting: none      There were no known notable events for this encounter.

## 2023-12-16 NOTE — PROGRESS NOTES
Discharge instructions gone over with patient. All belongings with patient. IV removed. Tele monitor removed. All questions answered.  Patient wheeled down to lobby with transport.

## 2023-12-16 NOTE — DISCHARGE INSTRUCTIONS
Discharge Instructions for your Cardiac Device   CARDIAC DEVICE CLINIC  [phone]              Incision:   1.  Keep your incision clean and dry for 1 week.  2. May shower 7 days after the procedure. Do not submerge the incision in a tub, pool, hot tub, or lake for 4 weeks.  3. Your incision should look better each day. If you notice unusual swelling, redness, drainage or fever greater than 100 degrees, please call the Device Clinic or your Doctor's office.  4. Avoid using deodorants, powders, creams, lotions, etc on your incision for 4 weeks.   5. There are no stitches to be removed.  If you received a “glue” closure this may appear purple-gray and does not get removed but wears away slowly on its own.  Steri-strips (small white bandages) may be removed in one week or they may fall off on their own earlier.  Pain:  1. It is normal for the area around the incision to be tender for a few weeks following surgery. Pain relievers such as Tylenol or Motrin (whichever you can take) are usually sufficient for pain relief. If the pain gets worse instead of better than please call the Device Clinic or your Doctor.  Activity:  1. If you have a new device and new leads placed than avoid raising your arm above shoulder level for 4 weeks. Do no  anything weighing more than 15 pounds.   2. Avoid exercising with the arm on the side of your pacemaker.  So no golf, swimming, tennis, bowling etc for 4 weeks.      3. Driving: If you were driving prior to your procedure, you may resume driving in 1 week. If you experienced a loss of consciousness prior to your procedure, you should verify with your Doctor when you are able to resume driving.  ID CARD:  1. It is important to carry your device ID card with you at all times.   2. Inform doctors and health care providers that you have a pacemaker or defibrillator.  Electromagnetic Interference:  1. Microwave ovens are safe to use.  2. Cellular phones should be held to the  opposite ear from your device. Do not carry your phone in your shirt pocket. Some i-phones that self-charge can interfere with your device so be sure to keep it away from your pacemaker/defibrillator.   3. Read the Patient Booklet for more information. You may call either the Device Clinic [phone]  or the patient services of the device  with questions about specific electrical appliances and interference problems.    IT IS YOUR RESPONSIBILITY TO MAKE AND KEEP APPOINTMENTS.   Please refer to your Device clinic handout.

## 2023-12-16 NOTE — DISCHARGE SUMMARY
Discharge Diagnosis  Ventricular tachycardia (CMS/HCC)    Issues Requiring Follow-Up  With EP and Device clinic     Test Results Pending At Discharge  Pending Labs       No current pending labs.            Hospital Course   S/p CS lead revesion for high threshold, she started having HF sx after losing synchronization  , she had a  new LBP  lead placement. The old lead is capped, her threshold on LBP lead was slightly higher, from 0.5 at 0.5 to 1.75 at 0.5, CXR showed normal unchanged lead position , and impedance did not change.    Plan to discharge home on keflex for 7 day, and EP follow up.       Pertinent Physical Exam At Time of Discharge  Physical Exam    Home Medications     Medication List      START taking these medications     cephalexin 500 mg capsule; Commonly known as: Keflex; Take 1 capsule   (500 mg) by mouth every 8 hours for 7 days.     CONTINUE taking these medications     aspirin 81 mg EC tablet   atorvastatin 40 mg tablet; Commonly known as: Lipitor; TAKE 1 TABLET   DAILY   calcium citrate 200 mg (950 mg) tablet; Commonly known as: Calcitrate   carvedilol 25 mg tablet; Commonly known as: Coreg   cholecalciferol 50 MCG (2000 UT) tablet; Commonly known as: Vitamin D-3   coenzyme Q-10 100 mg capsule   DULoxetine 60 mg DR capsule; Commonly known as: Cymbalta; TAKE 1 CAPSULE   DAILY   Janumet  mg tablet; Generic drug: SITagliptin phos-metformin; Take   1 tablet by mouth once daily.   levothyroxine 137 mcg tablet; Commonly known as: Synthroid, Levoxyl;   TAKE 1 TABLET DAILY   lisinopril 2.5 mg tablet; TAKE 1 TABLET DAILY   magnesium oxide 500 mg tablet   Multivitamin 50 Plus; Generic drug: multivitamin with minerals iron-free   spironolactone 25 mg tablet; Commonly known as: Aldactone   torsemide 20 mg tablet; Commonly known as: Demadex       Outpatient Follow-Up  Future Appointments   Date Time Provider Department Center   2/19/2024 11:20 AM Jennifer Osorio DO [id number] [facility]   5/24/2024 10:30 AM  Mesfin Johnson MD [id number]       Reinaldo KENDRICK Hodge MD

## 2023-12-18 ENCOUNTER — PATIENT OUTREACH (OUTPATIENT)
Dept: PRIMARY CARE | Facility: CLINIC | Age: 73
End: 2023-12-18
Payer: MEDICARE

## 2023-12-18 NOTE — PROGRESS NOTES
Discharge Facility:  [facility] [place]  Discharge Diagnosis:  ventricular tachycardia  Admission Date:  12/15/23  Discharge Date: 12/16/23    PCP Appointment Date:  none available.  Message to office.    Specialist Appointment Date: none noted    Hospital Encounter and Summary: Linked   See discharge assessment below for further details     Engagement  Call Start Time: 0907 (12/18/2023  9:09 AM)    Medications  Medications reviewed with patient/caregiver?: Yes (12/18/2023  9:09 AM)  Is the patient having any side effects they believe may be caused by any medication additions or changes?: No (12/18/2023  9:09 AM)  Does the patient have all medications ordered at discharge?: Yes (12/18/2023  9:09 AM)  Is the patient taking all medications as directed (includes completed medication regime)?: Yes (12/18/2023  9:09 AM)  Medication Comments: reviewed new, changed, and discontinued meds. keflex is new. (12/18/2023  9:09 AM)    Appointments  Does the patient have a primary care provider?: Yes (12/18/2023  9:09 AM)  Care Management Interventions: -- (none available.  message to office.) (12/18/2023  9:09 AM)    Self Management  Has home health visited the patient within 72 hours of discharge?: Not applicable (12/18/2023  9:09 AM)    Patient Teaching  Does the patient have access to their discharge instructions?: Yes (12/18/2023  9:09 AM)  What is the patient's perception of their health status since discharge?: Improving (12/18/2023  9:09 AM)  Is the patient/caregiver able to teach back the hierarchy of who to call/visit for symptoms/problems? PCP, Specialist, Home Health nurse, Urgent Care, ED, 911: Yes (12/18/2023  9:09 AM)  Patient/Caregiver Education Comments: spoke with Nadeen.  states doing better.  has all meds.  no questions or concerns at this time.  prefers call in a few weeks from this nurse, rather than taking contact info. (12/18/2023  9:09 AM)

## 2023-12-21 ENCOUNTER — OFFICE VISIT (OUTPATIENT)
Dept: PRIMARY CARE | Facility: CLINIC | Age: 73
End: 2023-12-21
Payer: MEDICARE

## 2023-12-21 VITALS
BODY MASS INDEX: 45.8 KG/M2 | WEIGHT: 242.6 LBS | HEART RATE: 69 BPM | RESPIRATION RATE: 17 BRPM | SYSTOLIC BLOOD PRESSURE: 114 MMHG | HEIGHT: 61 IN | OXYGEN SATURATION: 95 % | DIASTOLIC BLOOD PRESSURE: 60 MMHG

## 2023-12-21 DIAGNOSIS — Z09 HOSPITAL DISCHARGE FOLLOW-UP: Primary | ICD-10-CM

## 2023-12-21 DIAGNOSIS — T82.111A MALFUNCTION OF CARDIAC PACEMAKER, INITIAL ENCOUNTER: ICD-10-CM

## 2023-12-21 DIAGNOSIS — I47.20 VENTRICULAR TACHYCARDIA (MULTI): ICD-10-CM

## 2023-12-21 PROCEDURE — 1160F RVW MEDS BY RX/DR IN RCRD: CPT | Performed by: STUDENT IN AN ORGANIZED HEALTH CARE EDUCATION/TRAINING PROGRAM

## 2023-12-21 PROCEDURE — 1111F DSCHRG MED/CURRENT MED MERGE: CPT | Performed by: STUDENT IN AN ORGANIZED HEALTH CARE EDUCATION/TRAINING PROGRAM

## 2023-12-21 PROCEDURE — 4010F ACE/ARB THERAPY RXD/TAKEN: CPT | Performed by: STUDENT IN AN ORGANIZED HEALTH CARE EDUCATION/TRAINING PROGRAM

## 2023-12-21 PROCEDURE — 1126F AMNT PAIN NOTED NONE PRSNT: CPT | Performed by: STUDENT IN AN ORGANIZED HEALTH CARE EDUCATION/TRAINING PROGRAM

## 2023-12-21 PROCEDURE — 3078F DIAST BP <80 MM HG: CPT | Performed by: STUDENT IN AN ORGANIZED HEALTH CARE EDUCATION/TRAINING PROGRAM

## 2023-12-21 PROCEDURE — 1036F TOBACCO NON-USER: CPT | Performed by: STUDENT IN AN ORGANIZED HEALTH CARE EDUCATION/TRAINING PROGRAM

## 2023-12-21 PROCEDURE — 99214 OFFICE O/P EST MOD 30 MIN: CPT | Performed by: STUDENT IN AN ORGANIZED HEALTH CARE EDUCATION/TRAINING PROGRAM

## 2023-12-21 PROCEDURE — 1159F MED LIST DOCD IN RCRD: CPT | Performed by: STUDENT IN AN ORGANIZED HEALTH CARE EDUCATION/TRAINING PROGRAM

## 2023-12-21 PROCEDURE — 3074F SYST BP LT 130 MM HG: CPT | Performed by: STUDENT IN AN ORGANIZED HEALTH CARE EDUCATION/TRAINING PROGRAM

## 2023-12-21 ASSESSMENT — PATIENT HEALTH QUESTIONNAIRE - PHQ9
1. LITTLE INTEREST OR PLEASURE IN DOING THINGS: NOT AT ALL
2. FEELING DOWN, DEPRESSED OR HOPELESS: NOT AT ALL
SUM OF ALL RESPONSES TO PHQ9 QUESTIONS 1 AND 2: 0

## 2023-12-21 NOTE — PROGRESS NOTES
Subjective   Patient ID: Nadeen Nguyễn is a 73 y.o. female who presents for Follow-up (Pt is here for a hospital follow up [facility] for ventricular tachycardia.).  Patient was admitted from a 12/15 to 12/16 to the [facility] for pacemaker malfunction    Pt was having her pacemaker interrogated and it was noted that one lead was not working.  She was sent to the ED for which it was noted malfunction of the lead. Lead was unable to be removed, another was placed.    S/p CS lead revesion for high threshold, she started having HF sx after losing synchronization  , she had a  new LBP  lead placement. The old lead is capped.  Patient was discharged home with 7 days of Keflex.     Rechecked device on 12/15   Feeling good, wound going well       Objective   Physical Exam  Vitals reviewed.   Constitutional:       Appearance: Normal appearance.   Cardiovascular:      Rate and Rhythm: Normal rate and regular rhythm.      Heart sounds: No murmur heard.  Pulmonary:      Effort: Pulmonary effort is normal. No respiratory distress.      Breath sounds: Normal breath sounds. No wheezing.   Musculoskeletal:      Cervical back: Neck supple.      Left lower leg: No edema.   Skin:            Comments: Left chest pacemaker pocket clean dry and intact incision   Neurological:      Mental Status: She is alert.         Assessment/Plan   Hospital discharge follow-up  Hospital notes, imaging and labs reviewed  Patient doing well post pacemaker lead placement  Incision of the left chest pocket is clean and dry without any surrounding erythema  Patient to continue following with EP and cardiology as previously scheduled.  Patient to follow-up for routine health maintenance in 1 to 2 months         Jennifer Osorio DO 12/21/23 12:25 PM

## 2023-12-22 ENCOUNTER — HOSPITAL ENCOUNTER (OUTPATIENT)
Dept: CARDIOLOGY | Facility: CLINIC | Age: 73
Discharge: HOME | End: 2023-12-22
Payer: MEDICARE

## 2023-12-22 DIAGNOSIS — I42.9 CARDIOMYOPATHY, UNSPECIFIED TYPE (MULTI): ICD-10-CM

## 2023-12-29 ENCOUNTER — PATIENT OUTREACH (OUTPATIENT)
Dept: PRIMARY CARE | Facility: CLINIC | Age: 73
End: 2023-12-29
Payer: MEDICARE

## 2023-12-29 NOTE — PROGRESS NOTES
Call regarding appt. with PCP on 12/21/23 after hospitalization.  At time of outreach call the patient feels as if their condition has improved since last visit.    No questions or concerns at this time.  Aware to call if needs arise.

## 2024-01-10 ENCOUNTER — HOSPITAL ENCOUNTER (OUTPATIENT)
Dept: CARDIOLOGY | Facility: CLINIC | Age: 74
Discharge: HOME | End: 2024-01-10
Payer: MEDICARE

## 2024-01-10 DIAGNOSIS — I42.9 CARDIOMYOPATHY, UNSPECIFIED TYPE (MULTI): ICD-10-CM

## 2024-01-10 PROCEDURE — 93284 PRGRMG EVAL IMPLANTABLE DFB: CPT | Performed by: INTERNAL MEDICINE

## 2024-01-10 PROCEDURE — 93290 INTERROG DEV EVAL ICPMS IP: CPT | Performed by: INTERNAL MEDICINE

## 2024-01-10 PROCEDURE — 93284 PRGRMG EVAL IMPLANTABLE DFB: CPT

## 2024-01-15 ENCOUNTER — PATIENT OUTREACH (OUTPATIENT)
Dept: PRIMARY CARE | Facility: CLINIC | Age: 74
End: 2024-01-15
Payer: MEDICARE

## 2024-01-15 NOTE — PROGRESS NOTES
Call placed regarding one month post discharge follow up call.  At time of outreach call the patient feels as if their condition has improved since initial visit with PCP or specialist.    Spoke with Nadeen.  States doing good.  Aware to call with questions or concerns.

## 2024-01-30 DIAGNOSIS — I10 HYPERTENSION, UNSPECIFIED TYPE: ICD-10-CM

## 2024-01-30 RX ORDER — SPIRONOLACTONE 25 MG/1
12.5 TABLET ORAL DAILY
Qty: 45 TABLET | Refills: 1 | Status: SHIPPED | OUTPATIENT
Start: 2024-01-30 | End: 2024-05-13

## 2024-01-30 RX ORDER — LISINOPRIL 2.5 MG/1
2.5 TABLET ORAL DAILY
Qty: 90 TABLET | Refills: 1 | Status: SHIPPED | OUTPATIENT
Start: 2024-01-30 | End: 2024-05-13

## 2024-02-12 ENCOUNTER — HOSPITAL ENCOUNTER (OUTPATIENT)
Dept: CARDIOLOGY | Facility: CLINIC | Age: 74
Discharge: HOME | End: 2024-02-12
Payer: MEDICARE

## 2024-02-12 DIAGNOSIS — I42.9 CARDIOMYOPATHY, UNSPECIFIED TYPE (MULTI): ICD-10-CM

## 2024-02-19 ENCOUNTER — LAB (OUTPATIENT)
Dept: LAB | Facility: LAB | Age: 74
End: 2024-02-19
Payer: MEDICARE

## 2024-02-19 ENCOUNTER — OFFICE VISIT (OUTPATIENT)
Dept: PRIMARY CARE | Facility: CLINIC | Age: 74
End: 2024-02-19
Payer: MEDICARE

## 2024-02-19 VITALS
WEIGHT: 239.4 LBS | BODY MASS INDEX: 45.2 KG/M2 | HEIGHT: 61 IN | HEART RATE: 68 BPM | RESPIRATION RATE: 17 BRPM | DIASTOLIC BLOOD PRESSURE: 60 MMHG | SYSTOLIC BLOOD PRESSURE: 112 MMHG | OXYGEN SATURATION: 95 %

## 2024-02-19 DIAGNOSIS — E55.9 VITAMIN D DEFICIENCY: ICD-10-CM

## 2024-02-19 DIAGNOSIS — E78.2 MIXED HYPERLIPIDEMIA: ICD-10-CM

## 2024-02-19 DIAGNOSIS — E03.9 HYPOTHYROIDISM, UNSPECIFIED TYPE: Primary | ICD-10-CM

## 2024-02-19 DIAGNOSIS — E11.9 TYPE 2 DIABETES MELLITUS WITHOUT COMPLICATION, WITHOUT LONG-TERM CURRENT USE OF INSULIN (MULTI): ICD-10-CM

## 2024-02-19 DIAGNOSIS — I10 PRIMARY HYPERTENSION: ICD-10-CM

## 2024-02-19 LAB
ANION GAP SERPL CALC-SCNC: 22 MMOL/L (ref 10–20)
BUN SERPL-MCNC: 24 MG/DL (ref 6–23)
CALCIUM SERPL-MCNC: 9.4 MG/DL (ref 8.6–10.3)
CHLORIDE SERPL-SCNC: 102 MMOL/L (ref 98–107)
CO2 SERPL-SCNC: 25 MMOL/L (ref 21–32)
CREAT SERPL-MCNC: 1.03 MG/DL (ref 0.5–1.05)
EGFRCR SERPLBLD CKD-EPI 2021: 58 ML/MIN/1.73M*2
GLUCOSE SERPL-MCNC: 140 MG/DL (ref 74–99)
POC HEMOGLOBIN A1C: 6.8 % (ref 4.2–6.5)
POTASSIUM SERPL-SCNC: 4.5 MMOL/L (ref 3.5–5.3)
SODIUM SERPL-SCNC: 144 MMOL/L (ref 136–145)

## 2024-02-19 PROCEDURE — 1160F RVW MEDS BY RX/DR IN RCRD: CPT | Performed by: STUDENT IN AN ORGANIZED HEALTH CARE EDUCATION/TRAINING PROGRAM

## 2024-02-19 PROCEDURE — 4010F ACE/ARB THERAPY RXD/TAKEN: CPT | Performed by: STUDENT IN AN ORGANIZED HEALTH CARE EDUCATION/TRAINING PROGRAM

## 2024-02-19 PROCEDURE — 3078F DIAST BP <80 MM HG: CPT | Performed by: STUDENT IN AN ORGANIZED HEALTH CARE EDUCATION/TRAINING PROGRAM

## 2024-02-19 PROCEDURE — 36415 COLL VENOUS BLD VENIPUNCTURE: CPT

## 2024-02-19 PROCEDURE — 1126F AMNT PAIN NOTED NONE PRSNT: CPT | Performed by: STUDENT IN AN ORGANIZED HEALTH CARE EDUCATION/TRAINING PROGRAM

## 2024-02-19 PROCEDURE — 82306 VITAMIN D 25 HYDROXY: CPT

## 2024-02-19 PROCEDURE — 3074F SYST BP LT 130 MM HG: CPT | Performed by: STUDENT IN AN ORGANIZED HEALTH CARE EDUCATION/TRAINING PROGRAM

## 2024-02-19 PROCEDURE — 82607 VITAMIN B-12: CPT

## 2024-02-19 PROCEDURE — 1036F TOBACCO NON-USER: CPT | Performed by: STUDENT IN AN ORGANIZED HEALTH CARE EDUCATION/TRAINING PROGRAM

## 2024-02-19 PROCEDURE — 80048 BASIC METABOLIC PNL TOTAL CA: CPT

## 2024-02-19 PROCEDURE — 99214 OFFICE O/P EST MOD 30 MIN: CPT | Performed by: STUDENT IN AN ORGANIZED HEALTH CARE EDUCATION/TRAINING PROGRAM

## 2024-02-19 PROCEDURE — 1159F MED LIST DOCD IN RCRD: CPT | Performed by: STUDENT IN AN ORGANIZED HEALTH CARE EDUCATION/TRAINING PROGRAM

## 2024-02-19 PROCEDURE — 83036 HEMOGLOBIN GLYCOSYLATED A1C: CPT | Performed by: STUDENT IN AN ORGANIZED HEALTH CARE EDUCATION/TRAINING PROGRAM

## 2024-02-19 NOTE — PROGRESS NOTES
Subjective   Patient ID: Nadeen Nguyễn is a 73 y.o. female who presents for Diabetes (Pt is here for a 6 month diabetic check.).    Continues to feel a bit of fatigue that has not improved over the past 2 months since her lead pacemaker replacement      Objective   Physical Exam  Vitals reviewed.   Constitutional:       Appearance: Normal appearance.   Cardiovascular:      Rate and Rhythm: Normal rate and regular rhythm.      Heart sounds: No murmur heard.  Pulmonary:      Effort: Pulmonary effort is normal. No respiratory distress.      Breath sounds: Normal breath sounds. No wheezing.   Musculoskeletal:      Cervical back: Neck supple.      Left lower leg: No edema.   Skin:            Comments: Pacemaker pocket clean dry and intact    Neurological:      Mental Status: She is alert.       Assessment/Plan   Nadeen Nunez is a 73-year-old female with hypertension, diabetes, CAD status post pacemaker placement, hyperlipidemia, hypothyroidism and obesity    DM  A1C 6.8% which is down from 7.1% 8/23  Janumet  mg daily   Lisinopril 2.5mg daily   Atorvastatin 40mg daily     CAD s/p pacemaker placement   S/p CS lead revesion for high threshold, she started having HF sx after losing synchronization  , she had a  new LBP  lead placement  Working well     HTN  112/60 mmHg   Lisinopril 2.5mg   Spironolactone 25mg daily   Carvedilol 25 mg daily  Physical exam was stable. Discussed maintaining diets such as DASH to help maintain normal Blood pressure. Encouraged regular exercise for a total of 120 min weekly. We will fu labs in 1-3 days. For now there will be no change in medical management. All questions and concerns were addressed. Pt will follow up in 4-6 months or sooner if needed.     DSL  continue atorvastatin 40mg daily   Lipid panel ordered today     Hypothyroidism   continue levothyroxine 137mcg daily      Obesity   see DM for plan     NOEL and Obesity (AHI 54 on 2012) on CPAP-12 and morbid obesity, S/P Bariatric  surgery 2014  Body mass index is 48>46  Using vitamins due to bariatric surgery  Diet and Physical exercise recommendation  She has a pulmonologist              Jennifer Osorio DO 02/19/24 11:29 AM

## 2024-02-20 LAB
25(OH)D3 SERPL-MCNC: 64 NG/ML (ref 30–100)
VIT B12 SERPL-MCNC: 630 PG/ML (ref 211–911)

## 2024-03-15 ENCOUNTER — PATIENT OUTREACH (OUTPATIENT)
Dept: PRIMARY CARE | Facility: CLINIC | Age: 74
End: 2024-03-15
Payer: MEDICARE

## 2024-04-10 ENCOUNTER — HOSPITAL ENCOUNTER (OUTPATIENT)
Dept: CARDIOLOGY | Facility: CLINIC | Age: 74
Discharge: HOME | End: 2024-04-10
Payer: MEDICARE

## 2024-04-10 DIAGNOSIS — I42.9 CARDIOMYOPATHY, UNSPECIFIED TYPE (MULTI): ICD-10-CM

## 2024-04-10 PROCEDURE — 93284 PRGRMG EVAL IMPLANTABLE DFB: CPT

## 2024-04-10 PROCEDURE — 93284 PRGRMG EVAL IMPLANTABLE DFB: CPT | Performed by: INTERNAL MEDICINE

## 2024-05-08 DIAGNOSIS — E11.9 TYPE 2 DIABETES MELLITUS WITHOUT COMPLICATION, WITHOUT LONG-TERM CURRENT USE OF INSULIN (MULTI): Primary | ICD-10-CM

## 2024-05-08 RX ORDER — METFORMIN HYDROCHLORIDE 500 MG/1
500 TABLET ORAL
Qty: 180 TABLET | Refills: 1 | Status: SHIPPED | OUTPATIENT
Start: 2024-05-08 | End: 2025-05-03

## 2024-05-08 RX ORDER — DAPAGLIFLOZIN 5 MG/1
5 TABLET, FILM COATED ORAL DAILY
Qty: 90 TABLET | Refills: 1 | Status: SHIPPED | OUTPATIENT
Start: 2024-05-08 | End: 2024-05-24 | Stop reason: WASHOUT

## 2024-05-13 DIAGNOSIS — I10 HYPERTENSION, UNSPECIFIED TYPE: ICD-10-CM

## 2024-05-13 RX ORDER — LISINOPRIL 2.5 MG/1
2.5 TABLET ORAL DAILY
Qty: 90 TABLET | Refills: 1 | Status: SHIPPED | OUTPATIENT
Start: 2024-05-13

## 2024-05-13 RX ORDER — SPIRONOLACTONE 25 MG/1
TABLET ORAL
Qty: 45 TABLET | Refills: 1 | Status: SHIPPED | OUTPATIENT
Start: 2024-05-13

## 2024-05-20 DIAGNOSIS — E11.9 TYPE 2 DIABETES MELLITUS WITHOUT COMPLICATION, WITHOUT LONG-TERM CURRENT USE OF INSULIN (MULTI): ICD-10-CM

## 2024-05-20 RX ORDER — SITAGLIPTIN AND METFORMIN HYDROCHLORIDE 500; 50 MG/1; MG/1
1 TABLET, FILM COATED ORAL DAILY
Qty: 90 TABLET | Refills: 1 | Status: SHIPPED | OUTPATIENT
Start: 2024-05-20

## 2024-05-22 PROBLEM — T82.111A MALFUNCTION OF CARDIAC PACEMAKER: Status: ACTIVE | Noted: 2024-05-22

## 2024-05-22 PROBLEM — Z86.79 HISTORY OF HEART FAILURE: Status: ACTIVE | Noted: 2024-05-22

## 2024-05-22 PROBLEM — I82.A19: Status: ACTIVE | Noted: 2024-05-22

## 2024-05-22 PROBLEM — M79.89 SWELLING OF LOWER EXTREMITY: Status: ACTIVE | Noted: 2024-05-22

## 2024-05-23 PROBLEM — J01.90 ACUTE SINUSITIS: Status: ACTIVE | Noted: 2024-05-23

## 2024-05-23 PROBLEM — M47.812 SPONDYLOSIS OF CERVICAL SPINE WITHOUT MYELOPATHY: Status: ACTIVE | Noted: 2024-05-23

## 2024-05-23 PROBLEM — M48.061 SPINAL STENOSIS OF LUMBAR REGION: Status: ACTIVE | Noted: 2022-09-17

## 2024-05-23 PROBLEM — H61.20 IMPACTED CERUMEN: Status: ACTIVE | Noted: 2024-05-23

## 2024-05-24 ENCOUNTER — OFFICE VISIT (OUTPATIENT)
Dept: CARDIOLOGY | Facility: CLINIC | Age: 74
End: 2024-05-24
Payer: MEDICARE

## 2024-05-24 VITALS
BODY MASS INDEX: 44.93 KG/M2 | HEART RATE: 73 BPM | WEIGHT: 238 LBS | SYSTOLIC BLOOD PRESSURE: 115 MMHG | DIASTOLIC BLOOD PRESSURE: 84 MMHG | OXYGEN SATURATION: 97 % | HEIGHT: 61 IN | RESPIRATION RATE: 18 BRPM | TEMPERATURE: 98.6 F

## 2024-05-24 DIAGNOSIS — I10 PRIMARY HYPERTENSION: ICD-10-CM

## 2024-05-24 DIAGNOSIS — I20.9 ANGINA PECTORIS (CMS-HCC): ICD-10-CM

## 2024-05-24 DIAGNOSIS — E78.2 MIXED HYPERLIPIDEMIA: ICD-10-CM

## 2024-05-24 DIAGNOSIS — I25.118 ATHEROSCLEROTIC HEART DISEASE OF NATIVE CORONARY ARTERY WITH OTHER FORMS OF ANGINA PECTORIS (CMS-HCC): ICD-10-CM

## 2024-05-24 DIAGNOSIS — I50.32 CHRONIC DIASTOLIC HEART FAILURE (MULTI): ICD-10-CM

## 2024-05-24 DIAGNOSIS — I10 ESSENTIAL HYPERTENSION: ICD-10-CM

## 2024-05-24 DIAGNOSIS — I42.0 DILATED CARDIOMYOPATHY (MULTI): ICD-10-CM

## 2024-05-24 PROCEDURE — 99213 OFFICE O/P EST LOW 20 MIN: CPT | Performed by: INTERNAL MEDICINE

## 2024-05-24 PROCEDURE — 3079F DIAST BP 80-89 MM HG: CPT | Performed by: INTERNAL MEDICINE

## 2024-05-24 PROCEDURE — 1160F RVW MEDS BY RX/DR IN RCRD: CPT | Performed by: INTERNAL MEDICINE

## 2024-05-24 PROCEDURE — 1159F MED LIST DOCD IN RCRD: CPT | Performed by: INTERNAL MEDICINE

## 2024-05-24 PROCEDURE — 1036F TOBACCO NON-USER: CPT | Performed by: INTERNAL MEDICINE

## 2024-05-24 PROCEDURE — 3074F SYST BP LT 130 MM HG: CPT | Performed by: INTERNAL MEDICINE

## 2024-05-24 PROCEDURE — 4010F ACE/ARB THERAPY RXD/TAKEN: CPT | Performed by: INTERNAL MEDICINE

## 2024-05-24 PROCEDURE — 1126F AMNT PAIN NOTED NONE PRSNT: CPT | Performed by: INTERNAL MEDICINE

## 2024-05-24 RX ORDER — IBUPROFEN 100 MG/5ML
1000 SUSPENSION, ORAL (FINAL DOSE FORM) ORAL DAILY
COMMUNITY

## 2024-05-24 RX ORDER — VITAMIN B COMPLEX
1 TABLET ORAL 2 TIMES WEEKLY
COMMUNITY

## 2024-05-24 ASSESSMENT — PATIENT HEALTH QUESTIONNAIRE - PHQ9
SUM OF ALL RESPONSES TO PHQ9 QUESTIONS 1 AND 2: 2
2. FEELING DOWN, DEPRESSED OR HOPELESS: SEVERAL DAYS
1. LITTLE INTEREST OR PLEASURE IN DOING THINGS: SEVERAL DAYS
10. IF YOU CHECKED OFF ANY PROBLEMS, HOW DIFFICULT HAVE THESE PROBLEMS MADE IT FOR YOU TO DO YOUR WORK, TAKE CARE OF THINGS AT HOME, OR GET ALONG WITH OTHER PEOPLE: SOMEWHAT DIFFICULT

## 2024-05-24 ASSESSMENT — LIFESTYLE VARIABLES
SKIP TO QUESTIONS 9-10: 1
AUDIT-C TOTAL SCORE: 0
HOW OFTEN DO YOU HAVE SIX OR MORE DRINKS ON ONE OCCASION: NEVER
HOW MANY STANDARD DRINKS CONTAINING ALCOHOL DO YOU HAVE ON A TYPICAL DAY: PATIENT DOES NOT DRINK
HOW OFTEN DO YOU HAVE A DRINK CONTAINING ALCOHOL: NEVER

## 2024-05-24 ASSESSMENT — ENCOUNTER SYMPTOMS
DEPRESSION: 0
OCCASIONAL FEELINGS OF UNSTEADINESS: 1
LOSS OF SENSATION IN FEET: 0

## 2024-05-24 ASSESSMENT — PAIN SCALES - GENERAL: PAINLEVEL: 0-NO PAIN

## 2024-05-24 NOTE — PROGRESS NOTES
Subjective   Chief Complaint   Patient presents with    Follow-up     Nadeen Nguyễn presents to the office today for a 6 month follow up.         73-year-old female patient with a history of hypertension hyperlipidemia.  Patient currently on aspirin 81, Lipitor 40 mg tablet p.o. daily, Coreg 25 mg tablet p.o. twice daily with lisinopril 2.5 mg once a day and spironolactone and torsemide.  Patient was last seen November last year essentially stable cardiac wise.  Underlying chronic stable C diastolic CHF.  History of ICD pacer placed in the past.  Negative stress test with ischemia about 2 years ago.  History of CABG in 2007, PCI coronary artery 2008.  History bariatric surgery in the past.  Patient had a BMP done about 3 months ago with glucose 140 otherwise unremarkable.  Vitamin D level was 64.  Patient hemoglobin A1c is 6.8% 3 months ago.  CBC was unremarkable except white count was mildly elevated about 5 months ago.  Lipid profile was done 8 months ago showed total cholesterol 142, LDL is 59 mg deciliter.    Past Medical History:   Diagnosis Date    Angina pectoris (CMS-HCC) 08/15/2023    Atherosclerotic heart disease of native coronary artery with other forms of angina pectoris (CMS-HCC) 08/15/2023    Cardiac defibrillator in place 08/15/2023    Cardiomyopathy ([facility]) 08/15/2023    Coronary artery disease 08/15/2023    Encounter for screening mammogram for malignant neoplasm of breast 04/11/2016    Visit for screening mammogram    Heart failure (Multi) 08/15/2023    Hyperlipidemia 08/15/2023    Hypertension 08/15/2023    Hypothyroidism 08/15/2023    Mixed hyperlipidemia 08/15/2023    Obstructive sleep apnea 08/15/2023    Personal history of other diseases of the nervous system and sense organs     History of sleep apnea    Spinal stenosis, lumbar region without neurogenic claudication     Lumbar canal stenosis    Spondylosis without myelopathy or radiculopathy, cervical region 10/29/2015    Spondylosis of  cervical region without myelopathy or radiculopathy    Type 2 diabetes mellitus (Multi) 08/15/2023    Ventricular fibrillation (Multi) 08/15/2023    Ventricular tachycardia (Multi) 08/15/2023     Past Surgical History:   Procedure Laterality Date    ABLATION OF DYSRHYTHMIC FOCUS      CARDIAC CATHETERIZATION      CARDIAC ELECTROPHYSIOLOGY PROCEDURE N/A 12/15/2023    Procedure: ICD UPGRADE BIV;  Surgeon: Anselmo Lilly MD;  Location: [place] [address] Cardiac Cath Lab;  Service: Electrophysiology;  Laterality: N/A;  [id number] upgrade to BIV/ICD from dual chamber ICD [facility] [facility]    CARDIAC PACEMAKER PLACEMENT  04/11/2016    Pacemaker Placement    CARDIAC PACEMAKER PLACEMENT  06/14/2013    Pacemaker Placement    CHOLECYSTECTOMY  03/21/2013    Cholecystectomy    COLONOSCOPY  10/09/2019    Complete Colonoscopy    CORONARY STENT PLACEMENT      GALLBLADDER SURGERY  06/18/2013    Gallbladder Surgery    OTHER SURGICAL HISTORY  11/22/2013    Gastric Surgery For Morbid Obesity Laparoscopic Longitudinal Gastrectomy    TONSILLECTOMY  06/18/2013    Tonsillectomy     Family medical history includes stroke in father.  Current Outpatient Medications   Medication Sig Dispense Refill    ascorbic acid (Vitamin C) 1,000 mg tablet Take 1 tablet (1,000 mg) by mouth once daily.      aspirin 81 mg EC tablet Take 1 tablet (81 mg) by mouth once daily.      atorvastatin (Lipitor) 40 mg tablet TAKE 1 TABLET DAILY 90 tablet 1    calcium citrate (Calcitrate) 200 mg (950 mg) tablet Take 2 tablets (400 mg) by mouth 2 times a day.      carvedilol (Coreg) 25 mg tablet Take 1 tablet (25 mg) by mouth 2 times daily (morning and late afternoon).      cholecalciferol (Vitamin D-3) 50 MCG (2000 UT) tablet Take 1 tablet (50 mcg) by mouth once daily.      coenzyme Q-10 100 mg capsule Take 2 capsules (200 mg) by mouth once daily.      cyanocobalamin, vitamin B-12, 2,500 mcg tablet, sublingual SL tablet Place 1 tablet (2,500 mcg) under the tongue 2 times a  "week. Monday and Friday      DULoxetine (Cymbalta) 60 mg DR capsule TAKE 1 CAPSULE DAILY 90 capsule 3    levothyroxine (Synthroid, Levoxyl) 137 mcg tablet TAKE 1 TABLET DAILY 90 tablet 3    lisinopril 2.5 mg tablet TAKE 1 TABLET ONCE DAILY 90 tablet 1    magnesium oxide 500 mg tablet Take 1 tablet (500 mg) by mouth once daily.      metFORMIN (Glucophage) 500 mg tablet Take 1 tablet (500 mg) by mouth 2 times a day with meals. 180 tablet 1    multivitamin-iron-minerals-folic acid (Multivitamin 50 Plus) tablet Take 1 tablet by mouth once daily.      SITagliptin phos-metformin (Janumet)  mg tablet Take 1 tablet by mouth once daily. 90 tablet 1    spironolactone (Aldactone) 25 mg tablet TAKE 1/2 TABLET (=12.5MG)  ONCE DAILY 45 tablet 1    torsemide (Demadex) 20 mg tablet Take 1 tablet (20 mg) by mouth once daily.      dapagliflozin propanediol (Farxiga) 5 mg Take 1 tablet (5 mg) by mouth once daily. (Patient not taking: Reported on 5/24/2024) 90 tablet 1     No current facility-administered medications for this visit.      reports that she has never smoked. She has never been exposed to tobacco smoke. She has never used smokeless tobacco. She reports that she does not drink alcohol and does not use drugs.  Azithromycin, Erythromycin, and Nsaids (non-steroidal anti-inflammatory drug)  Dilated cardiomyopathy (Multi)  Angina pectoris (CMS-HCC)  Atherosclerotic heart disease of native coronary artery with other forms of angina pectoris (CMS-HCC)  Primary hypertension  Chronic diastolic heart failure (Multi)  Mixed hyperlipidemia  Essential hypertension    Vitals:    05/24/24 0958   BP: 115/84   Pulse: 73   Resp: 18   Temp: 37 °C (98.6 °F)   SpO2: 97%   Weight: 108 kg (238 lb)   Height: 1.549 m (5' 1\")   PainSc: 0-No pain      BMI:Body mass index is 44.97 kg/m².   General Cardiology:  General Appearance: Alert, oriented and in no acute distress.  HEENT: extra ocular movements intact (EOMI), pupils equal,  round, " reactive to light and accommodation (PERRLA).  Carotid Upstroke: no bruit, normal.  Jugular Venous Distention (JVD): flat.  Chest: normal.  Lungs: Clear to auscultation,   Heart Sounds: no S3 or S4, normal S1, S2, regular rate.  Murmur, Click, Gallop: no systolic murmur.  Abdomen: no hepatomegaly, no masses felt, soft.  Extremities: no leg edema.  Peripheral pulses: 2 plus bilateral.  NEUROLOGY Cranial nerves II-XII grossly intact.     Patient Active Problem List   Diagnosis    Angina pectoris (CMS-HCC)    Arthritis of knee    Cardiac defibrillator in place    Cardiomyopathy (Multi)    Cat scratch    Chronic right shoulder pain    Atherosclerotic heart disease of native coronary artery with other forms of angina pectoris (CMS-HCC)    Coronary artery disease    Depression    Eosinophilia    Hypertension    Hearing loss    Heart failure (Multi)    Hordeolum externum of left eye    Hyperlipidemia    Mixed hyperlipidemia    Hypothyroidism    Insufficient sleep syndrome    Morbid obesity (Multi)    Obstructive sleep apnea    Sciatica of right side associated with disorder of lumbar spine    Shortness of breath    Sleep-related hypoventilation due to medical condition    Type 2 diabetes mellitus (Multi)    Ventricular fibrillation (Multi)    Ventricular tachycardia (Multi)    Falls    Leukocytosis    Sensorineural hearing loss (SNHL) of both ears    Acute deep vein thrombosis (DVT) of axillary vein (Multi)    History of heart failure    Malfunction of cardiac pacemaker    Swelling of lower extremity    Acute sinusitis    Impacted cerumen    Spinal stenosis of lumbar region    Spondylosis of cervical spine without myelopathy       Problem List Items Addressed This Visit       Angina pectoris (CMS-HCC)    Cardiomyopathy (Multi)    Atherosclerotic heart disease of native coronary artery with other forms of angina pectoris (CMS-HCC)    Hypertension    Heart failure (Multi)    Mixed hyperlipidemia     Other Visit Diagnoses        Essential hypertension               This 70-year-old female patient with multiple comorbid condition.  History of chronic and diastolic CHF.  Recently ventricular lead revision was done.  No active chest pain tightness.  Stable cardiac wise.  1.  CAD: Patient with a history of CABG in the past no active chest pain tightness.  Continue current statin therapy, aspirin with the Coreg.  2.  Chronic diastolic CHF: Continue current lisinopril with carvedilol with the diuretics including torsemide and spironolactone.  CHF education was done.Diet and exercise reviewed with patient..advice to walk about 10,000 steps or about 2 hours during day time. Cut back on salt, sugar and flour.  Advised patient to avoid lunch meats, canned soups, pizzas, bread rolls, and sandwiches. Advised patient to limit salt intake 1,500 mg daily. Advised patient to exercise 30 mins/3 times a week including treadmill or aerobic type, Goal to achieve 65% target HR.    Pt. care time is spent includes review of diagnostic tests, labs, radiographs, EKGs, old echoes, cardiac work-up and coordination of care. Assessment, impression and plans are reflected in the note above as well as the orders.    Mesfin Johnson MD

## 2024-06-26 ENCOUNTER — APPOINTMENT (OUTPATIENT)
Dept: PRIMARY CARE | Facility: CLINIC | Age: 74
End: 2024-06-26
Payer: MEDICARE

## 2024-06-26 VITALS
DIASTOLIC BLOOD PRESSURE: 56 MMHG | HEIGHT: 61 IN | OXYGEN SATURATION: 98 % | WEIGHT: 241 LBS | RESPIRATION RATE: 17 BRPM | HEART RATE: 75 BPM | SYSTOLIC BLOOD PRESSURE: 90 MMHG | BODY MASS INDEX: 45.5 KG/M2

## 2024-06-26 DIAGNOSIS — M18.0 ARTHRITIS OF CARPOMETACARPAL (CMC) JOINT OF BOTH THUMBS: ICD-10-CM

## 2024-06-26 DIAGNOSIS — Z12.31 ENCOUNTER FOR SCREENING MAMMOGRAM FOR MALIGNANT NEOPLASM OF BREAST: ICD-10-CM

## 2024-06-26 DIAGNOSIS — M54.42 CHRONIC BILATERAL LOW BACK PAIN WITH LEFT-SIDED SCIATICA: ICD-10-CM

## 2024-06-26 DIAGNOSIS — Z86.79 HISTORY OF HEART FAILURE: ICD-10-CM

## 2024-06-26 DIAGNOSIS — G89.29 CHRONIC BILATERAL LOW BACK PAIN WITH LEFT-SIDED SCIATICA: ICD-10-CM

## 2024-06-26 DIAGNOSIS — I25.10 CORONARY ARTERY DISEASE, UNSPECIFIED VESSEL OR LESION TYPE, UNSPECIFIED WHETHER ANGINA PRESENT, UNSPECIFIED WHETHER NATIVE OR TRANSPLANTED HEART: Primary | ICD-10-CM

## 2024-06-26 DIAGNOSIS — E78.2 MIXED HYPERLIPIDEMIA: ICD-10-CM

## 2024-06-26 DIAGNOSIS — I10 HYPERTENSION, UNSPECIFIED TYPE: ICD-10-CM

## 2024-06-26 DIAGNOSIS — E66.01 MORBID OBESITY (MULTI): ICD-10-CM

## 2024-06-26 DIAGNOSIS — E11.40 TYPE 2 DIABETES MELLITUS WITH DIABETIC NEUROPATHY, UNSPECIFIED WHETHER LONG TERM INSULIN USE (MULTI): ICD-10-CM

## 2024-06-26 DIAGNOSIS — E11.9 TYPE 2 DIABETES MELLITUS WITHOUT COMPLICATION, UNSPECIFIED WHETHER LONG TERM INSULIN USE (MULTI): ICD-10-CM

## 2024-06-26 DIAGNOSIS — N18.30 STAGE 3 CHRONIC KIDNEY DISEASE, UNSPECIFIED WHETHER STAGE 3A OR 3B CKD (MULTI): ICD-10-CM

## 2024-06-26 DIAGNOSIS — Z00.00 ROUTINE GENERAL MEDICAL EXAMINATION AT HEALTH CARE FACILITY: ICD-10-CM

## 2024-06-26 DIAGNOSIS — I10 PRIMARY HYPERTENSION: ICD-10-CM

## 2024-06-26 RX ORDER — SPIRONOLACTONE 25 MG/1
12.5 TABLET ORAL 2 TIMES DAILY
Qty: 90 TABLET | Refills: 1 | Status: SHIPPED | OUTPATIENT
Start: 2024-06-26 | End: 2024-12-23

## 2024-06-26 RX ORDER — CARVEDILOL 12.5 MG/1
12.5 TABLET ORAL 2 TIMES DAILY
Qty: 60 TABLET | Refills: 11 | Status: SHIPPED | OUTPATIENT
Start: 2024-06-26 | End: 2025-06-26

## 2024-06-26 ASSESSMENT — ACTIVITIES OF DAILY LIVING (ADL)
DOING_HOUSEWORK: INDEPENDENT
BATHING: INDEPENDENT
MANAGING_FINANCES: INDEPENDENT
TAKING_MEDICATION: INDEPENDENT
GROCERY_SHOPPING: INDEPENDENT
DRESSING: INDEPENDENT

## 2024-06-26 NOTE — PROGRESS NOTES
"Subjective   Reason for Visit: Nadeen Nguyễn is an 73 y.o. female here for a Medicare Wellness visit.               She has been having intermittent dizziness over the past three weeks. Her BP has been soft worse with changing positions.   No current heart palpitations or chest pain.     Left buttock itching, no rash,   Achiness especially in the morning wor  Thumb pain bilateral- recommend    Patient Care Team:  Jennifer Osorio DO as PCP - General (Family Medicine)  Jennifer Osorio DO as PCP - [employer] PCP  Shailesh Valerio MD (Internal Medicine)  Jennifer Osorio DO       Objective   Vitals:  BP (!) 74/40   Pulse 75   Resp 17   Ht 1.549 m (5' 1\")   Wt 109 kg (241 lb)   LMP  (LMP Unknown)   SpO2 98%   BMI 45.54 kg/m²       Physical Exam  Vitals reviewed.   Constitutional:       General: She is not in acute distress.     Appearance: She is obese. She is not ill-appearing.   HENT:      Right Ear: Tympanic membrane and ear canal normal.      Left Ear: Tympanic membrane and ear canal normal.      Mouth/Throat:      Mouth: Mucous membranes are moist.      Pharynx: Oropharynx is clear. No oropharyngeal exudate or posterior oropharyngeal erythema.   Eyes:      Extraocular Movements: Extraocular movements intact.      Conjunctiva/sclera: Conjunctivae normal.      Pupils: Pupils are equal, round, and reactive to light.   Neck:      Vascular: No carotid bruit.   Cardiovascular:      Rate and Rhythm: Normal rate and regular rhythm.      Heart sounds: No murmur heard.     No gallop.   Pulmonary:      Effort: Pulmonary effort is normal.      Breath sounds: Normal breath sounds. No wheezing, rhonchi or rales.   Abdominal:      General: Abdomen is flat. Bowel sounds are normal.      Palpations: Abdomen is soft.      Tenderness: There is no abdominal tenderness.   Musculoskeletal:      Cervical back: Neck supple.      Left lower leg: No edema.   Skin:     General: Skin is warm and dry.      Findings: No " rash.   Neurological:      General: No focal deficit present.      Mental Status: She is alert and oriented to person, place, and time.      Gait: Gait normal.   Psychiatric:         Mood and Affect: Mood normal.         Behavior: Behavior normal.         Assessment/Plan   Problem List Items Addressed This Visit       Coronary artery disease - Primary    History of heart failure    Type 2 diabetes mellitus (Multi)   Nadeen Nunez is a 73-year-old female with hypertension, diabetes, CAD status post pacemaker placement, hyperlipidemia, hypothyroidism and obesity who presents today for mwv and 6 month follow up.     Medicare wellness and cpe   Updated age related health maintenance activities   Health care agent assigned   Screenings completed        DM  A1C 6.8% which is down from 7.1% 8/23  Janumet  mg daily   Metformin 500mg BID (added due to cost for pt)  Lisinopril 2.5mg daily   Atorvastatin 40mg daily      HTN  74/40mmHg Pt is symptomatic improved to 90/56 mmHg   Pt advised to hold night time medications today   Hold medications for BP <100 mmHg systolic   Lisinopril 2.5mg   Spironolactone 25mg daily   Decrease Carvedilol 12.5 mg BID  Physical exam was stable. Discussed maintaining diets such as DASH to help maintain normal Blood pressure. Encouraged regular exercise for a total of 120 min weekly. We will fu labs in 1-3 days. For now there will be no change in medical management. All questions and concerns were addressed. Pt will follow up in 4-6 months or sooner if needed.     Left sciatic Pain   Refractory to stretching and exercises   Referral to pain management for injections given      CAD s/p pacemaker placement   Hx CABG in 2007, PCI coronary  artery 2008.   S/p CS lead revesion for high threshold, she started having HF sx after losing synchronization  , she had a  new LBP  lead placement  Working well     DSL  continue atorvastatin 40mg daily   Lipid panel ordered today    CKD  Stage IIIA GFR 58    stable  ACE and SGLT-2 inhibitor on board   Continue to monitor     Hypothyroidism   continue levothyroxine 137mcg daily      Obesity   see DM for plan      NOEL and Obesity (AHI 54 on 2012) on CPAP-12 and morbid obesity, S/P Bariatric surgery 2014  Body mass index is 48>46  Using vitamins due to bariatric surgery  Diet and Physical exercise recommendation  She has a pulmonologist    Health Maintenance   Topic Date Due    Echocardiogram  Never done    Diabetes: Foot Exam  Never done    RSV Pregnant patients and/or  patients aged 60+ years (1 - 1-dose 60+ series) Never done    Diabetes: Urine Protein Screening  04/01/2020    COVID-19 Vaccine (1 - 2023-24 season) Never done    Diabetes: Retinopathy Screening  04/10/2024    Diabetes: Hemoglobin A1C  05/19/2024    Medicare Annual Wellness Visit (AWV)  08/16/2024    Mammogram  09/15/2024    TSH Level  09/19/2024    Lipid Panel  09/19/2024    Creatinine Level  02/19/2025    Potassium Level  02/19/2025    Colorectal Cancer Screening  08/12/2025    DTaP/Tdap/Td Vaccines (2 - Td or Tdap) 08/15/2033    Influenza Vaccine  Completed    Pneumococcal Vaccine: 65+ Years  Completed    Zoster Vaccines  Completed    Hepatitis C Screening  Completed    Bone Density Scan  Completed    HIB Vaccines  Aged Out    Hepatitis B Vaccines  Aged Out    IPV Vaccines  Aged Out    Hepatitis A Vaccines  Aged Out    Meningococcal Vaccine  Aged Out    Rotavirus Vaccines  Aged Out    HPV Vaccines  Aged Out    Irritable Bowel Syndrome  Discontinued

## 2024-06-28 PROBLEM — I49.01 VENTRICULAR FIBRILLATION (MULTI): Status: RESOLVED | Noted: 2023-08-15 | Resolved: 2024-06-28

## 2024-06-28 PROBLEM — I82.A19: Status: RESOLVED | Noted: 2024-05-22 | Resolved: 2024-06-28

## 2024-06-28 PROBLEM — I47.20 VENTRICULAR TACHYCARDIA (MULTI): Status: RESOLVED | Noted: 2023-08-15 | Resolved: 2024-06-28

## 2024-06-28 PROBLEM — N18.30 STAGE 3 CHRONIC KIDNEY DISEASE, UNSPECIFIED WHETHER STAGE 3A OR 3B CKD (MULTI): Status: ACTIVE | Noted: 2024-06-28

## 2024-06-28 PROBLEM — E11.40 TYPE 2 DIABETES MELLITUS WITH DIABETIC NEUROPATHY, UNSPECIFIED WHETHER LONG TERM INSULIN USE (MULTI): Status: ACTIVE | Noted: 2024-06-28

## 2024-07-16 ENCOUNTER — APPOINTMENT (OUTPATIENT)
Dept: PAIN MEDICINE | Facility: CLINIC | Age: 74
End: 2024-07-16
Payer: MEDICARE

## 2024-07-16 ENCOUNTER — HOSPITAL ENCOUNTER (OUTPATIENT)
Dept: RADIOLOGY | Facility: CLINIC | Age: 74
Discharge: HOME | End: 2024-07-16
Payer: MEDICARE

## 2024-07-16 VITALS
WEIGHT: 240 LBS | HEIGHT: 61 IN | HEART RATE: 93 BPM | DIASTOLIC BLOOD PRESSURE: 68 MMHG | SYSTOLIC BLOOD PRESSURE: 106 MMHG | RESPIRATION RATE: 18 BRPM | BODY MASS INDEX: 45.31 KG/M2

## 2024-07-16 DIAGNOSIS — M48.062 SPINAL STENOSIS OF LUMBAR REGION WITH NEUROGENIC CLAUDICATION: Primary | ICD-10-CM

## 2024-07-16 DIAGNOSIS — M54.42 CHRONIC BILATERAL LOW BACK PAIN WITH LEFT-SIDED SCIATICA: ICD-10-CM

## 2024-07-16 DIAGNOSIS — M47.816 LUMBAR SPONDYLOSIS: ICD-10-CM

## 2024-07-16 DIAGNOSIS — G89.29 CHRONIC BILATERAL LOW BACK PAIN WITH LEFT-SIDED SCIATICA: ICD-10-CM

## 2024-07-16 DIAGNOSIS — M48.062 SPINAL STENOSIS OF LUMBAR REGION WITH NEUROGENIC CLAUDICATION: ICD-10-CM

## 2024-07-16 PROCEDURE — 3078F DIAST BP <80 MM HG: CPT | Performed by: PHYSICAL MEDICINE & REHABILITATION

## 2024-07-16 PROCEDURE — 72114 X-RAY EXAM L-S SPINE BENDING: CPT | Performed by: RADIOLOGY

## 2024-07-16 PROCEDURE — 72120 X-RAY BEND ONLY L-S SPINE: CPT

## 2024-07-16 PROCEDURE — 1125F AMNT PAIN NOTED PAIN PRSNT: CPT | Performed by: PHYSICAL MEDICINE & REHABILITATION

## 2024-07-16 PROCEDURE — 3074F SYST BP LT 130 MM HG: CPT | Performed by: PHYSICAL MEDICINE & REHABILITATION

## 2024-07-16 PROCEDURE — 1123F ACP DISCUSS/DSCN MKR DOCD: CPT | Performed by: PHYSICAL MEDICINE & REHABILITATION

## 2024-07-16 PROCEDURE — 99204 OFFICE O/P NEW MOD 45 MIN: CPT | Performed by: PHYSICAL MEDICINE & REHABILITATION

## 2024-07-16 PROCEDURE — 4010F ACE/ARB THERAPY RXD/TAKEN: CPT | Performed by: PHYSICAL MEDICINE & REHABILITATION

## 2024-07-16 RX ORDER — GABAPENTIN 300 MG/1
CAPSULE ORAL
Qty: 180 CAPSULE | Refills: 2 | Status: SHIPPED | OUTPATIENT
Start: 2024-07-16

## 2024-07-16 ASSESSMENT — PAIN SCALES - GENERAL: PAINLEVEL: 7

## 2024-07-16 NOTE — PROGRESS NOTES
Subjective   Patient ID: Nadeen Nguyễn is a 73 y.o. female with PMH of CAD (s/p PPM), heart failure, T2 DM, hypothyroid, HLD, CKD stage III who presents for Back Pain.    HPI  Ms. Nguyễn is a new pt to our clinic referred by Dr. Osorio, her PCP for chronic low back pain. The pain started mor kt 15 yrs back and pt has tried massages, chiropractors along with steroid injections in the past, the details of which are not available. She does endorse that the injections offered her good pain relief for more than a few months whenever she received them. The low back pain is constant, sharp stabbing, 2-3/10, with flare-ups to 7-8/10, occasionally shooting down the back of her rt or left leg, aggravated by walking, prolonged sitting, relieved by resting, not associated with any sensory or motor deficit or any bowel/bladder incontinence. Associated with tingling and numbness  in both her toes occasionally.          Oswestry disability index score: 56%       OARRS:  No data recorded  I have personally reviewed the OARRS report for Nadeen Nguyễn. I have considered the risks of abuse, dependence, addiction and diversion    Is the patient prescribed a combination of a benzodiazepine and opioid?  No    Last Urine Drug Screen / ordered today: No  No results found for this or any previous visit (from the past 8760 hour(s)).  N/A    Controlled Substance Agreement:  Date of the Last Agreement: NA    Monitoring and compliance:    ORT:    PDUQ:    Office Agreement:      Review of Systems     Current Outpatient Medications   Medication Instructions    ascorbic acid (VITAMIN C) 1,000 mg, oral, Daily    aspirin 81 mg EC tablet 1 tablet, oral, Daily    atorvastatin (LIPITOR) 40 mg, oral, Daily    calcium citrate (Calcitrate) 200 mg (950 mg) tablet Take 2 tablets (400 mg) by mouth 2 times a day.    carvedilol (COREG) 12.5 mg, oral, 2 times daily    cholecalciferol (VITAMIN D-3) 50 mcg, oral, Daily    coenzyme Q-10 200 mg, oral, Daily     cyanocobalamin, vitamin B-12, 2,500 mcg tablet, sublingual SL tablet 1 tablet, sublingual, 2 times weekly, Monday and Friday    DULoxetine (Cymbalta) 60 mg DR capsule TAKE 1 CAPSULE DAILY    gabapentin (Neurontin) 300 mg capsule Day 1 Take 300mg at bedtime, Day 2 300mg BID, Day 3 300mg TID, Day 4-6 300mg AM 300mg afternoon 600mg at bedtime, Day 7-9 300mg Am, 600mg afternoon, 600mg at bedtime, Day 10 and after 600mg TID.    levothyroxine (Synthroid, Levoxyl) 137 mcg tablet TAKE 1 TABLET DAILY    lisinopril 2.5 mg, oral, Daily    magnesium oxide 500 mg tablet 1 tablet, oral, Daily    metFORMIN (GLUCOPHAGE) 500 mg, oral, 2 times daily (morning and late afternoon)    multivitamin-iron-minerals-folic acid (Multivitamin 50 Plus) tablet 1 tablet, oral, Daily    SITagliptin phos-metformin (Janumet)  mg tablet 1 tablet, oral, Daily    spironolactone (ALDACTONE) 12.5 mg, oral, 2 times daily    torsemide (Demadex) 20 mg tablet 1 tablet, oral, Daily        Past Medical History:   Diagnosis Date    Angina pectoris (CMS-HCC) 08/15/2023    Atherosclerotic heart disease of native coronary artery with other forms of angina pectoris (CMS-HCC) 08/15/2023    Cardiac defibrillator in place 08/15/2023    Cardiomyopathy ([facility]) 08/15/2023    Coronary artery disease 08/15/2023    Encounter for screening mammogram for malignant neoplasm of breast 04/11/2016    Visit for screening mammogram    Heart failure (Multi) 08/15/2023    Hyperlipidemia 08/15/2023    Hypertension 08/15/2023    Hypothyroidism 08/15/2023    Mixed hyperlipidemia 08/15/2023    Obstructive sleep apnea 08/15/2023    Personal history of other diseases of the nervous system and sense organs     History of sleep apnea    Spinal stenosis, lumbar region without neurogenic claudication     Lumbar canal stenosis    Spondylosis without myelopathy or radiculopathy, cervical region 10/29/2015    Spondylosis of cervical region without myelopathy or radiculopathy    Type 2  diabetes mellitus (Multi) 08/15/2023    Ventricular fibrillation (Multi) 08/15/2023    Ventricular tachycardia (Multi) 08/15/2023        Past Surgical History:   Procedure Laterality Date    ABLATION OF DYSRHYTHMIC FOCUS      CARDIAC CATHETERIZATION      CARDIAC ELECTROPHYSIOLOGY PROCEDURE N/A 12/15/2023    Procedure: ICD UPGRADE BIV;  Surgeon: Anselmo Lilly MD;  Location: [place] [address] Cardiac Cath Lab;  Service: Electrophysiology;  Laterality: N/A;  [id number] upgrade to BIV/ICD from dual chamber ICD [facility]    CARDIAC PACEMAKER PLACEMENT  04/11/2016    Pacemaker Placement    CARDIAC PACEMAKER PLACEMENT  06/14/2013    Pacemaker Placement    CHOLECYSTECTOMY  03/21/2013    Cholecystectomy    COLONOSCOPY  10/09/2019    Complete Colonoscopy    CORONARY STENT PLACEMENT      GALLBLADDER SURGERY  06/18/2013    Gallbladder Surgery    OTHER SURGICAL HISTORY  11/22/2013    Gastric Surgery For Morbid Obesity Laparoscopic Longitudinal Gastrectomy    TONSILLECTOMY  06/18/2013    Tonsillectomy        Family History   Problem Relation Name Age of Onset    Colon cancer Mother Lisa Nguyễn     Hypertension Mother Lisa Nguyễn     Diabetes Mother Lisa Nguyễn     Cancer Mother Lisa Nguyễn     Stroke Father Navneet Nguyễn     Heart disease Father Navneet Nguyễn     Diabetes type II Maternal Grandmother Melissa Nunez     Diabetes type II Mother's Sister Raquel Rafatgreysonaudelia     Heart attack Mother's Brother Juan Nunez     Hypertension Mother's Brother Juan Nunez         Allergies   Allergen Reactions    Azithromycin Other and Unknown    Erythromycin Unknown    Nsaids (Non-Steroidal Anti-Inflammatory Drug) Unknown        Objective     Vitals:    07/16/24 0817   BP: 106/68   Pulse: 93   Resp: 18        Physical Exam    GENERAL EXAM  Vital Signs: Vital signs to include heart rate, respiration rate, blood pressure, and temperature were reviewed.  General Appearance:  Awake, alert, healthy appearing, well developed, No acute  distress.  Head: Normocephalic without evidence of head injury.  Neck: The appearance of the neck was normal without swelling with a midline trachea.  Eyes: The eyelids and eyebrows exhibited no abnormalities.  Pupils were not pin-point.  Sclera was without icterus.  Lungs: Respiration rhythm and depth was normal.  Respiratory movements were normal without labored breathing.  Cardiovascular: No peripheral edema was present.    Neurological: Patient was oriented to time, place, and person.  Speech was normal.  Balance, gait, and stance were unremarkable.    Psychiatric: Appearance was normal with appropriate dress.  Mood was euthymic and affect was normal.  Skin: Affected regions were without ecchymosis or skin lesions.    Physical exam as above except:  Back (MSK/neuro/skin):  Limited active and passive range of motion in all planes  Strength 5 out of 5 bilateral lower extremities  Sensation to light-touch grossly intact bilateral lower extremities  Deep tendon reflexes equal bilateral lower extremities  No edema/effusion/erythema  Skin: no skin lesions or scars  B SLR (-)  B SIJ tenderness (+)  B LYN (+)  Limited flexion/extension  Mild TTP, no muscle spasm over lumbar paraspinals     Assessment/Plan   Problem List Items Addressed This Visit             ICD-10-CM    Spinal stenosis of lumbar region with neurogenic claudication - Primary M48.062    Relevant Medications    gabapentin (Neurontin) 300 mg capsule    Other Relevant Orders    Referral to Physical Therapy    XR lumbar spine 4+ views w flexion extension    Lumbar spondylosis M47.816    Relevant Medications    gabapentin (Neurontin) 300 mg capsule    Other Relevant Orders    Referral to Physical Therapy    XR lumbar spine 4+ views w flexion extension     Other Visit Diagnoses         Codes    Chronic bilateral low back pain with left-sided sciatica     M54.42, G89.29            Ms. Nadeen Nguyễn is a 73 y.o. female with PMH of CAD (s/p PPM), heart failure,  T2 DM, hypothyroid, HLD, CKD stage III who presents for Back Pain. Based on her presentation, she seems to have lumbar spondylosis with  lumbar stenosis with neurogenic claudication. We recommend starting PT for her and follow up with CT myelogram if she does not get improvement as she is not a candidate for MRI due to her MRI-noncompatible pacemaker. In the meantime, we will proceed with Xray spine and start her on Gabapentin titration with renal dosing as needed.    Plan:  - Start PT for low back pain  - Gabapentin titration. Discussed the risks and benefits of the medication with the patinet. Will continue to monitor her renal function and adjust the dosing as needed  - Xray spine flexion and extension films  - Will FU with CT Myelogram spine after PT as the pt cannot have MRI given her  pacemaker    This note was generated with the aid of dictation software, there may be typos despite my attempts at proofreading.     Joselin Hernandez  PGY-2  Anesthesiology    I saw and evaluated the patient. I personally obtained the key and critical portions of the history and physical exam or was physically present for key and critical portions performed by the resident/fellow. I reviewed the resident/fellow's documentation and discussed the patient with the resident/fellow. I agree with the resident/fellow's medical decision making as documented in the note.    Jose Dominguez MD

## 2024-07-18 ENCOUNTER — OFFICE VISIT (OUTPATIENT)
Dept: ORTHOPEDIC SURGERY | Facility: CLINIC | Age: 74
End: 2024-07-18
Payer: MEDICARE

## 2024-07-18 ENCOUNTER — HOSPITAL ENCOUNTER (OUTPATIENT)
Dept: RADIOLOGY | Facility: CLINIC | Age: 74
Discharge: HOME | End: 2024-07-18
Payer: MEDICARE

## 2024-07-18 DIAGNOSIS — M79.645 BILATERAL THUMB PAIN: ICD-10-CM

## 2024-07-18 DIAGNOSIS — M18.0 ARTHRITIS OF CARPOMETACARPAL (CMC) JOINT OF BOTH THUMBS: ICD-10-CM

## 2024-07-18 DIAGNOSIS — M79.644 BILATERAL THUMB PAIN: ICD-10-CM

## 2024-07-18 DIAGNOSIS — R20.0 BILATERAL HAND NUMBNESS: Primary | ICD-10-CM

## 2024-07-18 PROCEDURE — 1160F RVW MEDS BY RX/DR IN RCRD: CPT | Performed by: ORTHOPAEDIC SURGERY

## 2024-07-18 PROCEDURE — 1036F TOBACCO NON-USER: CPT | Performed by: ORTHOPAEDIC SURGERY

## 2024-07-18 PROCEDURE — 99203 OFFICE O/P NEW LOW 30 MIN: CPT | Performed by: ORTHOPAEDIC SURGERY

## 2024-07-18 PROCEDURE — 4010F ACE/ARB THERAPY RXD/TAKEN: CPT | Performed by: ORTHOPAEDIC SURGERY

## 2024-07-18 PROCEDURE — 73130 X-RAY EXAM OF HAND: CPT | Mod: 50

## 2024-07-18 PROCEDURE — L3924 HFO WITHOUT JOINTS PRE OTS: HCPCS | Performed by: ORTHOPAEDIC SURGERY

## 2024-07-18 PROCEDURE — 99213 OFFICE O/P EST LOW 20 MIN: CPT | Performed by: ORTHOPAEDIC SURGERY

## 2024-07-18 PROCEDURE — 1123F ACP DISCUSS/DSCN MKR DOCD: CPT | Performed by: ORTHOPAEDIC SURGERY

## 2024-07-18 PROCEDURE — 1159F MED LIST DOCD IN RCRD: CPT | Performed by: ORTHOPAEDIC SURGERY

## 2024-07-18 ASSESSMENT — PATIENT HEALTH QUESTIONNAIRE - PHQ9
SUM OF ALL RESPONSES TO PHQ9 QUESTIONS 1 AND 2: 0
1. LITTLE INTEREST OR PLEASURE IN DOING THINGS: NOT AT ALL
2. FEELING DOWN, DEPRESSED OR HOPELESS: NOT AT ALL

## 2024-07-18 ASSESSMENT — PAIN - FUNCTIONAL ASSESSMENT: PAIN_FUNCTIONAL_ASSESSMENT: NO/DENIES PAIN

## 2024-07-18 ASSESSMENT — ENCOUNTER SYMPTOMS
OCCASIONAL FEELINGS OF UNSTEADINESS: 1
LOSS OF SENSATION IN FEET: 0
DEPRESSION: 0

## 2024-07-20 NOTE — PROGRESS NOTES
History of Present Illness:  Chief Complaint   Patient presents with    Left Hand - Pain     Left thumb pain    Right Hand - Pain     Right thumb pain       The patient presents today for evaluation of bilateral basilar thumb pain.  Symptoms began over 1 year ago on the left side and about 6 months ago on the right.  Left hand is more symptomatic than the right.  Pain is worse with gripping, pinching and twisting.  She also describes some numbness and tingling into her hands that occurs intermittently.  This seems to involve most digits.      Past Medical History:   Diagnosis Date    Angina pectoris (CMS-HCC) 08/15/2023    Atherosclerotic heart disease of native coronary artery with other forms of angina pectoris (CMS-HCC) 08/15/2023    Cardiac defibrillator in place 08/15/2023    Cardiomyopathy ([facility]) 08/15/2023    Coronary artery disease 08/15/2023    Encounter for screening mammogram for malignant neoplasm of breast 04/11/2016    Visit for screening mammogram    Heart failure (Multi) 08/15/2023    Hyperlipidemia 08/15/2023    Hypertension 08/15/2023    Hypothyroidism 08/15/2023    Mixed hyperlipidemia 08/15/2023    Obstructive sleep apnea 08/15/2023    Personal history of other diseases of the nervous system and sense organs     History of sleep apnea    Spinal stenosis, lumbar region without neurogenic claudication     Lumbar canal stenosis    Spondylosis without myelopathy or radiculopathy, cervical region 10/29/2015    Spondylosis of cervical region without myelopathy or radiculopathy    Type 2 diabetes mellitus (Multi) 08/15/2023    Ventricular fibrillation (Multi) 08/15/2023    Ventricular tachycardia (Multi) 08/15/2023       Medication Documentation Review Audit       Reviewed by Abigail Yousif CMA (Medical Assistant) on 07/18/24 at 1023      Medication Order Taking? Sig Documenting Provider Last Dose Status   ascorbic acid (Vitamin C) 1,000 mg tablet [id number] No Take 1 tablet (1,000 mg) by mouth once  daily. Jackie Reese MD Taking Active   aspirin 81 mg EC tablet [id number] No Take 1 tablet (81 mg) by mouth once daily. Jackie Reese MD Taking Active   atorvastatin (Lipitor) 40 mg tablet [id number] No TAKE 1 TABLET DAILY Jennifer Osorio DO Taking Active   calcium citrate (Calcitrate) 200 mg (950 mg) tablet [id number] No Take 2 tablets (400 mg) by mouth 2 times a day. Jackie Reese MD Taking Active   carvedilol (Coreg) 12.5 mg tablet [id number]  Take 1 tablet (12.5 mg) by mouth 2 times a day. Jennifer Osorio DO  Active   cholecalciferol (Vitamin D-3) 50 MCG (2000 UT) tablet [id number] No Take 1 tablet (50 mcg) by mouth once daily. Jackie Reese MD Taking Active   coenzyme Q-10 100 mg capsule [id number] No Take 2 capsules (200 mg) by mouth once daily. Historical MD Hugh Taking Active   cyanocobalamin, vitamin B-12, 2,500 mcg tablet, sublingual SL tablet [id number] No Place 1 tablet (2,500 mcg) under the tongue 2 times a week. Monday and Friday Jackie Reese MD Taking Active   DULoxetine (Cymbalta) 60 mg DR capsule [id number] No TAKE 1 CAPSULE DAILY Jennifer Osorio DO Taking Active   gabapentin (Neurontin) 300 mg capsule [id number]  Day 1 Take 300mg at bedtime, Day 2 300mg BID, Day 3 300mg TID, Day 4-6 300mg AM 300mg afternoon 600mg at bedtime, Day 7-9 300mg Am, 600mg afternoon, 600mg at bedtime, Day 10 and after 600mg TID. Jose Dominguez MD  Active   levothyroxine (Synthroid, Levoxyl) 137 mcg tablet [id number] No TAKE 1 TABLET DAILY Jennifer Osorio DO Taking Active   lisinopril 2.5 mg tablet [id number] No TAKE 1 TABLET ONCE DAILY Jennifer Osorio DO Taking Active   magnesium oxide 500 mg tablet [id number] No Take 1 tablet (500 mg) by mouth once daily. Jackie Reese MD Taking Active   metFORMIN (Glucophage) 500 mg tablet [id number] No Take 1 tablet (500 mg) by mouth 2 times a day with meals. Jennifer Osorio DO Taking Active   multivitamin-iron-minerals-folic acid (Multivitamin 50 Plus) tablet  [id number] No Take 1 tablet by mouth once daily. Historical Provider, MD Taking Active   SITagliptin phos-metformin (Janumet)  mg tablet [id number] No Take 1 tablet by mouth once daily. Jennifer Osorio,  Taking Active   spironolactone (Aldactone) 25 mg tablet [id number]  Take 0.5 tablets (12.5 mg) by mouth 2 times a day. Jennifer Osorio, DO  Active   torsemide (Demadex) 20 mg tablet [id number] No Take 1 tablet (20 mg) by mouth once daily. Historical Provider, MD Taking Active                    Allergies   Allergen Reactions    Azithromycin Other and Unknown    Erythromycin Unknown    Nsaids (Non-Steroidal Anti-Inflammatory Drug) Unknown       Social History     Socioeconomic History    Marital status: Single     Spouse name: Not on file    Number of children: Not on file    Years of education: Not on file    Highest education level: Not on file   Occupational History    Not on file   Tobacco Use    Smoking status: Never     Passive exposure: Never    Smokeless tobacco: Never   Vaping Use    Vaping status: Never Used   Substance and Sexual Activity    Alcohol use: Never    Drug use: Never    Sexual activity: Never   Other Topics Concern    Not on file   Social History Narrative    Not on file     Social Determinants of Health     Financial Resource Strain: Low Risk  (12/15/2023)    Overall Financial Resource Strain (CARDIA)     Difficulty of Paying Living Expenses: Not hard at all   Food Insecurity: Not on file   Transportation Needs: No Transportation Needs (12/15/2023)    PRAPARE - Transportation     Lack of Transportation (Medical): No     Lack of Transportation (Non-Medical): No   Physical Activity: Not on file   Stress: Not on file   Social Connections: Not on file   Intimate Partner Violence: Not on file   Housing Stability: Low Risk  (12/15/2023)    Housing Stability Vital Sign     Unable to Pay for Housing in the Last Year: No     Number of Places Lived in the Last Year: 1     Unstable Housing in the Last  Year: No       Past Surgical History:   Procedure Laterality Date    ABLATION OF DYSRHYTHMIC FOCUS      CARDIAC CATHETERIZATION      CARDIAC ELECTROPHYSIOLOGY PROCEDURE N/A 12/15/2023    Procedure: ICD UPGRADE BIV;  Surgeon: Anselmo Lilly MD;  Location: [place] [address] Cardiac Cath Lab;  Service: Electrophysiology;  Laterality: N/A;  [id number] upgrade to BIV/ICD from dual chamber ICD [facility]    CARDIAC PACEMAKER PLACEMENT  04/11/2016    Pacemaker Placement    CARDIAC PACEMAKER PLACEMENT  06/14/2013    Pacemaker Placement    CHOLECYSTECTOMY  03/21/2013    Cholecystectomy    COLONOSCOPY  10/09/2019    Complete Colonoscopy    CORONARY STENT PLACEMENT      GALLBLADDER SURGERY  06/18/2013    Gallbladder Surgery    OTHER SURGICAL HISTORY  11/22/2013    Gastric Surgery For Morbid Obesity Laparoscopic Longitudinal Gastrectomy    TONSILLECTOMY  06/18/2013    Tonsillectomy          Review of Systems   GENERAL: Negative for malaise, significant weight loss, fever  MUSCULOSKELETAL: see HPI  NEURO:  see HPI     Physical Examination:  Constitutional: Appears well-developed and well-nourished.  Head: Normocephalic and atraumatic.  Eyes: EOMI grossly  Cardiovascular: Intact distal pulses.   Respiratory: Effort normal. No respiratory distress.  Neurologic: Alert and oriented to person, place, and time.  Skin: Skin is warm and dry.  Hematologic / Lymphatic: No lymphedema, lymphangitis.  Psychiatric: normal mood and affect. Behavior is normal.   Musculoskeletal:  bilateral wrist/hands:  No obvious swelling or masses  No thenar atrophy  No atrophy noted of hypothenar eminence   Negative Spencer's/Phalens  Mild sensitivity about ulnar nerve at level of elbow.  Sensation grossly intact to all digits  5/5 thumb Abduction/Finger Abduction  Tenderness about thumb cmc joints with positive CMC grind.  No tenderness about first dorsal compartment/thumb A1 pulley region  Radial pulse palpable  Good capillary refill     Radiographs:  Bilateral hand radiographs ordered and available for my review/interpretation demonstrate thumb CMC degenerative changes as well as some DIP degenerative changes most pronounced in the middle and ring fingers     Assessment:  Patient with bilateral thumb basilar joint arthritis.  Also with bilateral hand numbness of unclear etiology given current exam findings     Plan:  Diagnosis was reviewed with patient.  We discussed treatments of thumb CMC arthritis.  Non-operative modalities include symptomatic splinting, anti-inflammatories, activity modifications, hand therapy and corticosteroid injections.  We also discussed role of surgical intervention if conservative measures prove unsuccessful.    She will begin with bracing for her left thumb.  She was fitted with a CMC Comfort Cool brace today.  Bilateral upper extremity EMG has been ordered for further workup of the hand numbness.

## 2024-07-22 ENCOUNTER — EVALUATION (OUTPATIENT)
Dept: OCCUPATIONAL THERAPY | Facility: CLINIC | Age: 74
End: 2024-07-22
Payer: MEDICARE

## 2024-07-22 ENCOUNTER — HOSPITAL ENCOUNTER (OUTPATIENT)
Dept: CARDIOLOGY | Facility: CLINIC | Age: 74
Discharge: HOME | End: 2024-07-22
Payer: MEDICARE

## 2024-07-22 DIAGNOSIS — I42.9 CARDIOMYOPATHY, UNSPECIFIED TYPE (MULTI): ICD-10-CM

## 2024-07-22 DIAGNOSIS — R20.0 BILATERAL HAND NUMBNESS: ICD-10-CM

## 2024-07-22 DIAGNOSIS — M18.0 ARTHRITIS OF CARPOMETACARPAL (CMC) JOINT OF BOTH THUMBS: ICD-10-CM

## 2024-07-22 PROCEDURE — 93296 REM INTERROG EVL PM/IDS: CPT

## 2024-07-22 PROCEDURE — 93295 DEV INTERROG REMOTE 1/2/MLT: CPT | Performed by: INTERNAL MEDICINE

## 2024-07-22 PROCEDURE — 97165 OT EVAL LOW COMPLEX 30 MIN: CPT | Mod: GO

## 2024-07-22 NOTE — PROGRESS NOTES
Occupational Therapy  Occupational Therapy Orthopedic Evaluation    Patient Name: Nadeen Nguyễn  MRN: [id number]  Today's Date: 7/23/2024  Time Calculation  Start Time: 1146  Stop Time: 1225  Time Calculation (min): 39 min  OT Evaluation Time Entry  OT Evaluation (Low) Time Entry: 30,  ,      Insurance:  Visit number: 1 of 1  Authorization info: no auth  Insurance Type: [employer]    General:  Reason for visit: B thumb pain   Referred by: Dr. Vicente    Current Problem  1. Arthritis of carpometacarpal (CMC) joint of both thumbs  Referral to Occupational Therapy      2. Bilateral hand numbness  Referral to Occupational Therapy          Precautions: none         Medical History Form: Reviewed (scanned into chart)    Subjective:   Chief Complaint: Intermittent B thumb pain   Onset: 2023  STANLEY: Insidious         Hand Dominance: Right    Current Condition since injury:   better      PAIN     Location: B 1st CMC joints   Description: Sharp, intermittent   3-4/10  Aggravating Factors: Gripping/pinching, Opening jars/containers, Lifting/Carrying, and Finger/Thumb Flexion writing  Relieving Factors: Heat , rest, bracing     Relevant Information (PMH & Previous Tests/Imaging): XR  Previous Interventions/Treatments: None     Prior Level of Function (PLOF)  Exercise/Physical Activity: Writing, card making   Work/School: Retired Teacher  Current ADL/IADL Status: Independent      Patients Living Environment: Reviewed and no concern    Primary Language: English    Pt goals for therapy: Ways to help when flare ups occur    Red Flags: Do you have any of the following? No  Fever/chills, unexplained weight changes, dizziness/fainting, unexplained change in bowel or bladder functions, unexplained malaise or muscle weakness, night pain/sweats, numbness or tingling    Objective:    Right Hand AROM (degrees)   MCP PIP DIP DPC   IF     0   MF    0   RF    0   SF    0   Thumb Hyperextension at MPJ      Thumb RABD 40      Thumb  PABD 40        Left Hand AROM (degrees)   MCP PIP DIP DPC   IF        MF       RF       SF       Thumb Hyperextension at MPJ      Thumb RABD 40      Thumb PABD 40         *Tightness B adductor pollicis     Hand Strength Measures (lbs)   R L   Dynamometer  WFL WFL   Lateral Pinch 15 10   3jaw Pinch 12 9        Special Tests  Carpals  Medeiros's test: -  Lunotriquetral Shuck/Shear: -  Midcarpal Shift Test: -  DRUJ Instability/Piano Key: -    Wrist Tendon   Finkelstein's(DeQuervain's): -  CTS  Carpal Compression: -  Phalen: -  Reverse Phalen: -  Tinels at Carpal tunnel: -  TFCC   Ulnar Fovea Sign: -  TFCC Load Test: -  Supination Lift Test: -    Finger/Thumb  CMC Grind: + R and L  Froment's Sign: -  Thumb UCL: -  Manjula's Sign: -  Wartenburg's Sign: -  ORL Tightness: -  Intrinsic Tightness: -  Extrinsic Tightness: -  Truong's Test: -  Axel's Test: -    AIN Weakness: -  PIN Weakness: -    Physical Observation: Pt wearing L comfort cool orthosis   Edema: none    Sensory: WNL  Numbness/Tingling: Reports tingling B hands         Outcome Measures:  UEFI: 75/80    EDUCATION: home exercise program, plan of care, joint protections techniques hand out, thumb stabilization exercise program,  activity modifications, pain management, orthosis use, and injury pathology       Goals:  1. Pt will be independent with HEP and joint protection techniques. Goal met     Plan of care was developed with input and agreement by the patient    Treatments:     Modalities:       min       Therapeutic Exercise:    min       Manual Therapy:      min       Therapeutic Activity:     min       Neuromuscular Re-education:   min       Orthosis:       min      Wound Care:      min      Self Care:       min      Other Treatment:     min      Assessment: Patient is a 74 yo RHD female with B thumb pain  resulting in limited participation in pain-free ADLs and inability to perform at their prior level of function. Pt would benefit from occupational  therapy to address the impairments found & listed previously in the objective section in order to return to safe and pain-free ADLs and prior level of function.       Plan: Discharge OT. Goals met      Planned Interventions include: therapeutic exercise, HEP,therapeutic activity, and joint protection techniques  Frequency: 1 visit  Duration: 1 week    Jakob Cross, OT

## 2024-07-23 DIAGNOSIS — E78.5 HYPERLIPIDEMIA, UNSPECIFIED HYPERLIPIDEMIA TYPE: ICD-10-CM

## 2024-07-23 PROBLEM — M18.0 ARTHRITIS OF CARPOMETACARPAL (CMC) JOINT OF BOTH THUMBS: Status: ACTIVE | Noted: 2024-07-23

## 2024-07-23 RX ORDER — ATORVASTATIN CALCIUM 40 MG/1
40 TABLET, FILM COATED ORAL DAILY
Qty: 90 TABLET | Refills: 1 | Status: SHIPPED | OUTPATIENT
Start: 2024-07-23

## 2024-07-23 NOTE — PROGRESS NOTES
Discharge Summary    Name: Nadeen Nguyễn  MRN: [id number]  : 1950  Date: 24    Discharge Summary: OT    Discharge Information: Date of discharge 24, Date of evaluation 24, Number of attended visits 1, Referred by Dr. Vicente, and Referred for B thumb pain      Discharge Status: Pt is independent with HEP and joint protection techniques    Rehab Discharge Reason: Achieved all and/or the most significant goals(s)

## 2024-07-29 ENCOUNTER — APPOINTMENT (OUTPATIENT)
Dept: PHYSICAL THERAPY | Facility: CLINIC | Age: 74
End: 2024-07-29
Payer: MEDICARE

## 2024-07-29 DIAGNOSIS — G89.29 CHRONIC RIGHT-SIDED LOW BACK PAIN WITH RIGHT-SIDED SCIATICA: Primary | ICD-10-CM

## 2024-07-29 DIAGNOSIS — M47.816 LUMBAR SPONDYLOSIS: ICD-10-CM

## 2024-07-29 DIAGNOSIS — M48.062 SPINAL STENOSIS OF LUMBAR REGION WITH NEUROGENIC CLAUDICATION: ICD-10-CM

## 2024-07-29 DIAGNOSIS — M54.41 CHRONIC RIGHT-SIDED LOW BACK PAIN WITH RIGHT-SIDED SCIATICA: Primary | ICD-10-CM

## 2024-07-29 PROCEDURE — 97161 PT EVAL LOW COMPLEX 20 MIN: CPT | Mod: GP

## 2024-07-29 PROCEDURE — 97110 THERAPEUTIC EXERCISES: CPT | Mod: GP

## 2024-07-29 ASSESSMENT — ENCOUNTER SYMPTOMS
OCCASIONAL FEELINGS OF UNSTEADINESS: 1
LOSS OF SENSATION IN FEET: 1
DEPRESSION: 0

## 2024-07-29 NOTE — LETTER
July 29, 2024    Robinson Frost, PT  [address] [facility], [facility] [address]  [facility] [address]    Patient: Nadeen Nguyễn   YOB: 1950   Date of Visit: 7/29/2024       Dear Jose Dominguez MD  [address] [place] [address]    The attached plan of care is being sent to you because your patient’s medical reimbursement requires that you certify the plan of care. Your signature is required to allow uninterrupted insurance coverage.      You may indicate your approval by signing below and faxing this form back to us at Dept Fax: [phone].    Please call Dept: [phone] with any questions or concerns.    Thank you for this referral,        Robinson Frost PT  [facility] [address] [facility]  [address] [facility] [address]    Payer: Payor: [employer] / Plan: [employer] MEDICARE / Product Type: *No Product type* /                                                                         Date:     Dear Robinson Frost PT,     Re: Ms. Nadeen Nguyễn, MRN:[id number]    I certify that I have reviewed the attached plan of care and it is medically necessary for Ms. Nadeen Nguyễn (1950) who is under my care.          ______________________________________                    _________________  Provider name and credentials                                           Date and time                                                                                           Plan of Care 7/29/24   Effective from: 7/29/2024  Effective to: 11/26/2024    Plan ID: [id number]            Participants as of Finalize on 7/29/2024    Name Type Comments Contact Info    Jose Dominguez MD Referring Provider  [phone]    Robinson Frost PT Physical Therapist  [phone]       Last Plan Note     Author: Robinson Frost PT Status: Sign when Signing Visit Last edited: 7/29/2024 10:15 AM           Physical Therapy  Physical Therapy Evaluation    Patient Name: Nadeen Nguyễn  MRN:  [id number]  Today's Date: 7/29/2024  Time Calculation  Start Time: 1015  Stop Time: 1059  Time Calculation (min): 44 min    Insurance:  Visit number: 1  Insurance Type: requires no auth    General  Reason for visit: General  Reason for Referral: low back pain radciulopathy  Referred By: Angelica  Past Medical History Relevant to Rehab: CAD (s/p PPM), heart failure, T2 DM, hypothyroid, HLD, CKD stage III  General Comment: pt presents to PT clinic for LBP with sciatica that has gotten worse the past few months. it hurts most with position changes and neogitating stairs. the pain lessens with sitting positions and gets worse with long walking distances    Current Problem  1. Chronic right-sided low back pain with right-sided sciatica  Follow Up In Physical Therapy      2. Spinal stenosis of lumbar region with neurogenic claudication  Referral to Physical Therapy      3. Lumbar spondylosis  Referral to Physical Therapy          Assessment:    Pt presents to PT clinic with radiating pain into R leg that worsens with walking and positional changes and she states she finds relief with sitting.  Pt has impairments in ROM, strength, lumbar flexibility, and difficulty walking.  This impairs her ability to walk in the community, perform household chores, and complete IADLs as desired.  her pain quickly centralizes with repeated extension after peripheralizing with repeated trunk flexion.  While this isn't the typical stenosis pattern, her known retrolisthesis could be a confounding variable with her preferred pattern.  Pt would benefit from skilled therapy to improve core strengthening, BLE flexibility, and reduce radicular pain to improve her ambulation within the community.      Plan:   Treatment/Interventions: Cryotherapy, Dry needling, Education/ Instruction, Gait training, Hot pack, Manual therapy, Neuromuscular re-education, Self care/ home management, Taping techniques, Therapeutic activities, Therapeutic exercises  PT  Frequency: 1 time per week  Duration: x8-12 weeks  Onset Date: 07/16/24    Precautions:   Precautions  STEADI Fall Risk Score (The score of 4 or more indicates an increased risk of falling): 9  Precautions Comment: falls, pacemaker    Medical History Form: Reviewed (scanned into chart)    Subjective:   Onset: Onset Date: 07/16/24  STANLEY: Insidious     Current Condition since injury:   same     Social Determinants of health: No    PAIN     Aggravating Factors: standing, positional changes, and stairs  Relieving Factors: Sitting OTC NSAIDS, gabapentin    Relevant Information (PMH & Previous Tests/Imaging): remarkable for retrolisthesis   Previous Interventions/Treatments: Injections and Chiropractic     Prior Level of Function (PLOF)  Exercise/Physical Activity: makes reading cards, increased time in sitting  Work/School: retired   Current ADL/IADL Status: independent     Patients Living Environment: Reviewed and no concern    Primary Language: English    Pt goals for therapy: improve low back pain and tolerance for standing  Red Flags:   REVIEW OF SYSTEMS/ RED FLAGS  (+) osteoporosis Osteopenia  (-) Cough/ Sneeze   (-) balance difficulties/FALLS   (+) numbness/tingle posterior Right leg and toes  (-) weakness  (-) unremitting night pain   Sleep position:   (-) AM Stiffness           (-) Unexplained Wt. Loss  (-) h/o CA   (+) Pacemaker  (-) Bowel/Bladder            (-) saddle paresthesia    Objective:  FLOW SHEET  Lumbar Observation  Observation:: decreased intrarticular movement of lumbar spinal segments, level ASIS    Lumbar Palpation/Joint Mobility Assessment  Palpation/Joint Mobility Comment: increased hypertonicity of lumbar paraspinals b/l,    Lumbar AROM  Lumbar flexion: (60°): >50% restriction  Lumbar extension (25°): >50% restriction  Lumbar rotation right (30°): >50% restriction  Lumbar rotation left (30°): >50% restriction  Lumbar sidebend right (25°): >50% restriction  Lumbar sidebend left  (25°): >50% restriction    Hip AROM  R Hip Flexion (125°): 120  L Hip Flexion (125°): 120  R Hip Abduction (45°): 45  L Hip Abduction  (45°): 45  R Hip Extension (10°): -20  L Hip Extension (10°): -20  R hip ER: (45°): 45  L hip ER: (45°): 45  R hip IR: (45°): 20  L hip IR: (45°): 20    Lumbar Myotomes  L Hip Flex (L2): (5/5): 5/5  R Knee Ext (L3): (5/5): 5/5  L Knee Ext (L3): (5/5) : 5/5  R Ankle DF (L4): (5/5): 5/5  L Ankle DF (L4): (5/5): 5/5    Specific Lower Extremity MMT  R Gluteals (prone): (5/5): 3+/5  L Gluteals (prone): (5/5): 3+/5  R side gluteals: 3+/5  L side gluteals 3+/5    DTR  R Patellar L4: (2+): 1+  L Patellar L4: (2+): 1+  R Achilles S1: (2+): 1+  L Achilles S1: (2+): 1+    Dermatomes  Dermatomes WFL: yes    Lumbar Linden Assessment  Lumbar Linden Assessmnet: increased radicular symptoms with repeated flexion to mid thight and reduced immediately with repeated extension    Oswestry Disablity Index (PANCHO): 40% self reported disability    EDUCATION: home exercise program, plan of care, activity modifications, pain management, and injury pathology       Goals:  Active       PT Problem       report 25% decrease in pain intensity in order to complete work tasks with greater ease        Start:  07/29/24    Expected End:  11/26/24            Pt will improve PANCHO score by 10% to demonstrate in order to show increased functional mobility        Start:  07/29/24    Expected End:  11/26/24            demonstrate 2/3 a muscle grade strength increase in  b/l glutes in order to complete stair negotiation        Start:  07/29/24    Expected End:  11/26/24            increased ROM of b/l hips +10 degrees in order to improve gait mechanics        Start:  07/29/24    Expected End:  11/26/24            Pt to manage radicular symptoms with appropriate movements per POC       Start:  07/29/24    Expected End:  11/26/24                Plan of care was developed with input and agreement by the patient    Potential to  achieve goals:  Good    Treatments:   This therapist instructed and demonstrated interventions to patient, patient completed the following under direct supervision of this therapist:  Therapeutic Exercise:   min  22 MINUTES  Therapeutic Exercise  Therapeutic Exercise Activity 1: repeated seated extension 2x10  Therapeutic Exercise Activity 2: glute bridges  Therapeutic Exercise Activity 3: clamshells b/l      PT Evaluation Time Entry  PT Evaluation (Low) Time Entry: 22    Robinson Frost, PT    Access Code: [email or web address]  URL: [email or web address]  Date: 07/29/2024  Prepared by: Robinson Frost    Exercises  - Standing Lumbar Extension  - 4-5 x daily - 7 x weekly - 1 sets - 10-15 reps  - Seated Lumbar Extension  - 4-5 x daily - 7 x weekly - 1 sets - 10-15 reps  - Prone Press Up  - 4-5 x daily - 7 x weekly - 1 sets - 10-15 reps  - Supine Bridge  - 2 x daily - 7 x weekly - 1 sets - 20-30 reps  - Clamshell  - 2 x daily - 7 x weekly - 3 sets - 20-30 reps         Current Participants as of 7/29/2024    Name Type Comments Contact Info    Jose Dominguez MD Referring Provider  [phone]    Signature pending    Robinson Frost, PT Physical Therapist  [phone]    Signature pending

## 2024-07-29 NOTE — PROGRESS NOTES
Physical Therapy  Physical Therapy Evaluation    Patient Name: Nadeen Nguyễn  MRN: [id number]  Today's Date: 7/29/2024  Time Calculation  Start Time: 1015  Stop Time: 1059  Time Calculation (min): 44 min    Insurance:  Visit number: 1  Insurance Type: requires no auth    General  Reason for visit: General  Reason for Referral: low back pain radciulopathy  Referred By: Angelica  Past Medical History Relevant to Rehab: CAD (s/p PPM), heart failure, T2 DM, hypothyroid, HLD, CKD stage III  General Comment: pt presents to PT clinic for LBP with sciatica that has gotten worse the past few months. it hurts most with position changes and neogitating stairs. the pain lessens with sitting positions and gets worse with long walking distances    Current Problem  1. Chronic right-sided low back pain with right-sided sciatica  Follow Up In Physical Therapy      2. Spinal stenosis of lumbar region with neurogenic claudication  Referral to Physical Therapy      3. Lumbar spondylosis  Referral to Physical Therapy          Assessment:    Pt presents to PT clinic with radiating pain into R leg that worsens with walking and positional changes and she states she finds relief with sitting.  Pt has impairments in ROM, strength, lumbar flexibility, and difficulty walking.  This impairs her ability to walk in the community, perform household chores, and complete IADLs as desired.  her pain quickly centralizes with repeated extension after peripheralizing with repeated trunk flexion.  While this isn't the typical stenosis pattern, her known retrolisthesis could be a confounding variable with her preferred pattern.  Pt would benefit from skilled therapy to improve core strengthening, BLE flexibility, and reduce radicular pain to improve her ambulation within the community.      Plan:   Treatment/Interventions: Cryotherapy, Dry needling, Education/ Instruction, Gait training, Hot pack, Manual therapy, Neuromuscular re-education, Self care/  home management, Taping techniques, Therapeutic activities, Therapeutic exercises  PT Frequency: 1 time per week  Duration: x8-12 weeks  Onset Date: 07/16/24    Precautions:   Precautions  STEADI Fall Risk Score (The score of 4 or more indicates an increased risk of falling): 9  Precautions Comment: falls, pacemaker    Medical History Form: Reviewed (scanned into chart)    Subjective:   Onset: Onset Date: 07/16/24  STANLEY: Insidious     Current Condition since injury:   same     Social Determinants of health: No    PAIN     Aggravating Factors: standing, positional changes, and stairs  Relieving Factors: Sitting OTC NSAIDS, gabapentin    Relevant Information (PMH & Previous Tests/Imaging): remarkable for retrolisthesis   Previous Interventions/Treatments: Injections and Chiropractic     Prior Level of Function (PLOF)  Exercise/Physical Activity: makes reading cards, increased time in sitting  Work/School: retired   Current ADL/IADL Status: independent     Patients Living Environment: Reviewed and no concern    Primary Language: English    Pt goals for therapy: improve low back pain and tolerance for standing  Red Flags:   REVIEW OF SYSTEMS/ RED FLAGS  (+) osteoporosis Osteopenia  (-) Cough/ Sneeze   (-) balance difficulties/FALLS   (+) numbness/tingle posterior Right leg and toes  (-) weakness  (-) unremitting night pain   Sleep position:   (-) AM Stiffness           (-) Unexplained Wt. Loss  (-) h/o CA   (+) Pacemaker  (-) Bowel/Bladder            (-) saddle paresthesia    Objective:  FLOW SHEET  Lumbar Observation  Observation:: decreased intrarticular movement of lumbar spinal segments, level ASIS    Lumbar Palpation/Joint Mobility Assessment  Palpation/Joint Mobility Comment: increased hypertonicity of lumbar paraspinals b/l,    Lumbar AROM  Lumbar flexion: (60°): >50% restriction  Lumbar extension (25°): >50% restriction  Lumbar rotation right (30°): >50% restriction  Lumbar rotation left (30°): >50%  restriction  Lumbar sidebend right (25°): >50% restriction  Lumbar sidebend left (25°): >50% restriction    Hip AROM  R Hip Flexion (125°): 120  L Hip Flexion (125°): 120  R Hip Abduction (45°): 45  L Hip Abduction  (45°): 45  R Hip Extension (10°): -20  L Hip Extension (10°): -20  R hip ER: (45°): 45  L hip ER: (45°): 45  R hip IR: (45°): 20  L hip IR: (45°): 20    Lumbar Myotomes  L Hip Flex (L2): (5/5): 5/5  R Knee Ext (L3): (5/5): 5/5  L Knee Ext (L3): (5/5) : 5/5  R Ankle DF (L4): (5/5): 5/5  L Ankle DF (L4): (5/5): 5/5    Specific Lower Extremity MMT  R Gluteals (prone): (5/5): 3+/5  L Gluteals (prone): (5/5): 3+/5  R side gluteals: 3+/5  L side gluteals 3+/5    DTR  R Patellar L4: (2+): 1+  L Patellar L4: (2+): 1+  R Achilles S1: (2+): 1+  L Achilles S1: (2+): 1+    Dermatomes  Dermatomes WFL: yes    Lumbar Linden Assessment  Lumbar Linden Assessmnet: increased radicular symptoms with repeated flexion to mid thight and reduced immediately with repeated extension    Oswestry Disablity Index (PANCHO): 40% self reported disability    EDUCATION: home exercise program, plan of care, activity modifications, pain management, and injury pathology       Goals:  Active       PT Problem       report 25% decrease in pain intensity in order to complete work tasks with greater ease        Start:  07/29/24    Expected End:  11/26/24            Pt will improve PANCHO score by 10% to demonstrate in order to show increased functional mobility        Start:  07/29/24    Expected End:  11/26/24            demonstrate 2/3 a muscle grade strength increase in  b/l glutes in order to complete stair negotiation        Start:  07/29/24    Expected End:  11/26/24            increased ROM of b/l hips +10 degrees in order to improve gait mechanics        Start:  07/29/24    Expected End:  11/26/24            Pt to manage radicular symptoms with appropriate movements per POC       Start:  07/29/24    Expected End:  11/26/24                 Plan of care was developed with input and agreement by the patient    Potential to achieve goals:  Good    Treatments:   This therapist instructed and demonstrated interventions to patient, patient completed the following under direct supervision of this therapist:  Therapeutic Exercise:   min  22 MINUTES  Therapeutic Exercise  Therapeutic Exercise Activity 1: repeated seated extension 2x10  Therapeutic Exercise Activity 2: glute bridges  Therapeutic Exercise Activity 3: clamshells b/l      PT Evaluation Time Entry  PT Evaluation (Low) Time Entry: 22    Robinson Frost, PT    Access Code: [email or web address]  URL: [email or web address]  Date: 07/29/2024  Prepared by: Robinson Frost    Exercises  - Standing Lumbar Extension  - 4-5 x daily - 7 x weekly - 1 sets - 10-15 reps  - Seated Lumbar Extension  - 4-5 x daily - 7 x weekly - 1 sets - 10-15 reps  - Prone Press Up  - 4-5 x daily - 7 x weekly - 1 sets - 10-15 reps  - Supine Bridge  - 2 x daily - 7 x weekly - 1 sets - 20-30 reps  - Clamshell  - 2 x daily - 7 x weekly - 3 sets - 20-30 reps

## 2024-07-30 ENCOUNTER — APPOINTMENT (OUTPATIENT)
Dept: NEUROLOGY | Facility: CLINIC | Age: 74
End: 2024-07-30
Payer: MEDICARE

## 2024-07-30 DIAGNOSIS — R20.0 NUMBNESS: ICD-10-CM

## 2024-07-30 DIAGNOSIS — M79.602 PAIN OF LEFT UPPER EXTREMITY: Primary | ICD-10-CM

## 2024-07-30 DIAGNOSIS — R20.0 BILATERAL HAND NUMBNESS: ICD-10-CM

## 2024-07-30 DIAGNOSIS — M79.601 PAIN OF RIGHT UPPER EXTREMITY: ICD-10-CM

## 2024-07-30 PROCEDURE — 95912 NRV CNDJ TEST 11-12 STUDIES: CPT | Performed by: PSYCHIATRY & NEUROLOGY

## 2024-07-30 PROCEDURE — 95886 MUSC TEST DONE W/N TEST COMP: CPT | Performed by: PSYCHIATRY & NEUROLOGY

## 2024-07-30 NOTE — PROGRESS NOTES
Nerve conduction studies and needle EMG was performed today on this patient.  Full scanned report is available in the Procedure tab in Epic (Chart Review, Procedure tab, double-click the report to enlarge it).  Note:  Dr. Lopez remains available to perform EMG studies until 2024, when he will be retiring.    Impression:  Nerve conduction study and needle examination of bilateral upper extremities were performed, see details in table. The results were abnormal because of the followin. Bilaterally prolonged median sensory and median motor latencies. Consistent with mild bilateral median neuropathy at the wrist (carpal tunnel syndrome).        Lyle Lopez MD

## 2024-08-05 ENCOUNTER — APPOINTMENT (OUTPATIENT)
Dept: PHYSICAL THERAPY | Facility: CLINIC | Age: 74
End: 2024-08-05
Payer: MEDICARE

## 2024-08-05 DIAGNOSIS — G89.29 CHRONIC RIGHT-SIDED LOW BACK PAIN WITH RIGHT-SIDED SCIATICA: ICD-10-CM

## 2024-08-05 DIAGNOSIS — M54.41 CHRONIC RIGHT-SIDED LOW BACK PAIN WITH RIGHT-SIDED SCIATICA: ICD-10-CM

## 2024-08-05 PROCEDURE — 97110 THERAPEUTIC EXERCISES: CPT | Mod: GP

## 2024-08-05 ASSESSMENT — PAIN SCALES - GENERAL: PAINLEVEL_OUTOF10: 4

## 2024-08-05 ASSESSMENT — PAIN - FUNCTIONAL ASSESSMENT: PAIN_FUNCTIONAL_ASSESSMENT: 0-10

## 2024-08-05 NOTE — PROGRESS NOTES
"    Physical Therapy  Physical Therapy Treatment    Patient Name: Nadeen Nguyễn  MRN: [id number]  Today's Date: 8/5/2024  Time Calculation  Start Time: 1314  Stop Time: 1357  Time Calculation (min): 43 min    Assessment:   Pt tolerated all activities, mild c/o pain low back pain but no radicular pain, demonstrated good form with exercises but cues required for form from HEP    Pt completed treatment with moderate effort this date    Plan:  Continue with core strengthening       General  Chief Complaint:   1. Chronic right-sided low back pain with right-sided sciatica  Follow Up In Physical Therapy          Subjective:     Pt reports no sciatica pain and only has pain across her low back, she struggles with supine exercises at home     Current Problem  General  Reason for Referral: low back pain with radiculopathy  Referred By: Angelica  Past Medical History Relevant to Rehab: CAD (s/p PPM), heart failure, T2 DM, hypothyroid, HLD, CKD stage III  General Comment: visit 2    Precautions  Precautions Comment: falls, pacemaker    Performing HEP?: Yes    Pain  Pain Assessment: 0-10  0-10 (Numeric) Pain Score: 4  Pain Type: Chronic pain  Pain Location: Back    Objective:     Treatments:   This therapist instructed and demonstrated interventions to patient, patient completed the following under direct supervision of this therapist:  Therapeutic Exercise:   min  43 MINUTES  Therapeutic Exercise  Therapeutic Exercise Activity 1: repeated standing extension x15  Therapeutic Exercise Activity 2: glute bridges 2x20  Therapeutic Exercise Activity 3: hooklying clamshells red TB 2x25  Therapeutic Exercise Activity 4: hooklying hip adduction squeezes x2-3\" 2x20  Therapeutic Exercise Activity 5: palloff press single green tube 2x30 each direction  Therapeutic Exercise Activity 6: chops (no twist) single green tube x30 each direction  Therapeutic Exercise Activity 7: seated overhead press 2# x25  Therapeutic Exercise Activity 8: seated " horizontal abduction x20 yellow TB      Active       PT Problem       report 25% decrease in pain intensity in order to complete work tasks with greater ease        Start:  07/29/24    Expected End:  11/26/24            Pt will improve PANCHO score by 10% to demonstrate in order to show increased functional mobility        Start:  07/29/24    Expected End:  11/26/24            demonstrate 2/3 a muscle grade strength increase in  b/l glutes in order to complete stair negotiation        Start:  07/29/24    Expected End:  11/26/24            increased ROM of b/l hips +10 degrees in order to improve gait mechanics        Start:  07/29/24    Expected End:  11/26/24            Pt to manage radicular symptoms with appropriate movements per POC       Start:  07/29/24    Expected End:  11/26/24                Education:   Discussed new exercises in sitting and standing for HEP    Robinson Frost, PT

## 2024-08-12 ENCOUNTER — APPOINTMENT (OUTPATIENT)
Dept: PHYSICAL THERAPY | Facility: CLINIC | Age: 74
End: 2024-08-12
Payer: MEDICARE

## 2024-08-12 DIAGNOSIS — F32.A DEPRESSION, UNSPECIFIED DEPRESSION TYPE: ICD-10-CM

## 2024-08-12 DIAGNOSIS — G89.29 CHRONIC RIGHT-SIDED LOW BACK PAIN WITH RIGHT-SIDED SCIATICA: ICD-10-CM

## 2024-08-12 DIAGNOSIS — M54.41 CHRONIC RIGHT-SIDED LOW BACK PAIN WITH RIGHT-SIDED SCIATICA: ICD-10-CM

## 2024-08-12 PROCEDURE — 97110 THERAPEUTIC EXERCISES: CPT | Mod: GP

## 2024-08-12 RX ORDER — DULOXETIN HYDROCHLORIDE 60 MG/1
60 CAPSULE, DELAYED RELEASE ORAL DAILY
Qty: 90 CAPSULE | Refills: 1 | Status: SHIPPED | OUTPATIENT
Start: 2024-08-12

## 2024-08-12 ASSESSMENT — PAIN - FUNCTIONAL ASSESSMENT: PAIN_FUNCTIONAL_ASSESSMENT: 0-10

## 2024-08-12 ASSESSMENT — PAIN SCALES - GENERAL: PAINLEVEL_OUTOF10: 0 - NO PAIN

## 2024-08-12 NOTE — PROGRESS NOTES
Physical Therapy  Physical Therapy Treatment    Patient Name: Nadeen Nguyễn  MRN: [id number]  Today's Date: 8/12/2024  Time Calculation  Start Time: 1402  Stop Time: 1444  Time Calculation (min): 42 min    Assessment:   Pt did not have any adverse effects of today's modified session, no reports of radicular pain and no pain with exercises, and only mild cueing for form this date    Pt completed treatment with moderate effort this date    Plan:  Continue with core and LE strengthening with extension bias       General  Chief Complaint:   1. Chronic right-sided low back pain with right-sided sciatica  Follow Up In Physical Therapy          Subjective:     Pt reports she was sore for more than a day after last session, but today she feels great and its been the best she has been for a while    Current Problem  General  Reason for Referral: low back pain with radiculopathy  Referred By: Angelica  Past Medical History Relevant to Rehab: CAD (s/p PPM), heart failure, T2 DM, hypothyroid, HLD, CKD stage III  General Comment: visit 3    Precautions  Precautions Comment: falls, pacemaker    Performing HEP?: Yes    Pain  Pain Assessment: 0-10  0-10 (Numeric) Pain Score: 0 - No pain    Objective:     Treatments:   This therapist instructed and demonstrated interventions to patient, patient completed the following under direct supervision of this therapist:  Therapeutic Exercise:   min  42 MINUTES  Therapeutic Exercise  Therapeutic Exercise Activity 1: scit fit recumbent bicycle  Therapeutic Exercise Activity 2: b/l calf raises x25  Therapeutic Exercise Activity 3: palloff press single yellow tube 2x30 each direction  Therapeutic Exercise Activity 4: sidelying clamshells yellow TB x20  Therapeutic Exercise Activity 5: chops (no twist) single single tube x30 each direction  Therapeutic Exercise Activity 6: seated overhead press 2# x25  Therapeutic Exercise Activity 7: seated horizontal abduction x20 yellow TB  Therapeutic  Exercise Activity 8: seated thoracic extension single yellow tube      Active       PT Problem       report 25% decrease in pain intensity in order to complete work tasks with greater ease        Start:  07/29/24    Expected End:  11/26/24            Pt will improve PANCHO score by 10% to demonstrate in order to show increased functional mobility        Start:  07/29/24    Expected End:  11/26/24            demonstrate 2/3 a muscle grade strength increase in  b/l glutes in order to complete stair negotiation        Start:  07/29/24    Expected End:  11/26/24            increased ROM of b/l hips +10 degrees in order to improve gait mechanics        Start:  07/29/24    Expected End:  11/26/24            Pt to manage radicular symptoms with appropriate movements per POC       Start:  07/29/24    Expected End:  11/26/24                Education:   Discussed reducing resistance with exercises    Robinson Frost, PT

## 2024-08-19 ENCOUNTER — APPOINTMENT (OUTPATIENT)
Dept: PHYSICAL THERAPY | Facility: CLINIC | Age: 74
End: 2024-08-19
Payer: MEDICARE

## 2024-08-19 ENCOUNTER — LAB (OUTPATIENT)
Dept: LAB | Facility: LAB | Age: 74
End: 2024-08-19
Payer: MEDICARE

## 2024-08-19 DIAGNOSIS — I10 PRIMARY HYPERTENSION: ICD-10-CM

## 2024-08-19 DIAGNOSIS — E78.2 MIXED HYPERLIPIDEMIA: ICD-10-CM

## 2024-08-19 DIAGNOSIS — M54.41 CHRONIC RIGHT-SIDED LOW BACK PAIN WITH RIGHT-SIDED SCIATICA: ICD-10-CM

## 2024-08-19 DIAGNOSIS — E11.9 TYPE 2 DIABETES MELLITUS WITHOUT COMPLICATION, UNSPECIFIED WHETHER LONG TERM INSULIN USE (MULTI): ICD-10-CM

## 2024-08-19 DIAGNOSIS — G89.29 CHRONIC RIGHT-SIDED LOW BACK PAIN WITH RIGHT-SIDED SCIATICA: ICD-10-CM

## 2024-08-19 LAB
BASOPHILS # BLD AUTO: 0.1 X10*3/UL (ref 0–0.1)
BASOPHILS NFR BLD AUTO: 0.9 %
EOSINOPHIL # BLD AUTO: 0.5 X10*3/UL (ref 0–0.4)
EOSINOPHIL NFR BLD AUTO: 4.3 %
ERYTHROCYTE [DISTWIDTH] IN BLOOD BY AUTOMATED COUNT: 13.4 % (ref 11.5–14.5)
HCT VFR BLD AUTO: 44.2 % (ref 36–46)
HGB BLD-MCNC: 14.4 G/DL (ref 12–16)
IMM GRANULOCYTES # BLD AUTO: 0.08 X10*3/UL (ref 0–0.5)
IMM GRANULOCYTES NFR BLD AUTO: 0.7 % (ref 0–0.9)
LYMPHOCYTES # BLD AUTO: 2.61 X10*3/UL (ref 0.8–3)
LYMPHOCYTES NFR BLD AUTO: 22.3 %
MCH RBC QN AUTO: 29.2 PG (ref 26–34)
MCHC RBC AUTO-ENTMCNC: 32.6 G/DL (ref 32–36)
MCV RBC AUTO: 90 FL (ref 80–100)
MONOCYTES # BLD AUTO: 1.06 X10*3/UL (ref 0.05–0.8)
MONOCYTES NFR BLD AUTO: 9 %
NEUTROPHILS # BLD AUTO: 7.38 X10*3/UL (ref 1.6–5.5)
NEUTROPHILS NFR BLD AUTO: 62.8 %
NRBC BLD-RTO: 0 /100 WBCS (ref 0–0)
PLATELET # BLD AUTO: 293 X10*3/UL (ref 150–450)
RBC # BLD AUTO: 4.93 X10*6/UL (ref 4–5.2)
WBC # BLD AUTO: 11.7 X10*3/UL (ref 4.4–11.3)

## 2024-08-19 PROCEDURE — 85025 COMPLETE CBC W/AUTO DIFF WBC: CPT

## 2024-08-19 PROCEDURE — 36415 COLL VENOUS BLD VENIPUNCTURE: CPT

## 2024-08-19 PROCEDURE — 83036 HEMOGLOBIN GLYCOSYLATED A1C: CPT

## 2024-08-19 PROCEDURE — 80061 LIPID PANEL: CPT

## 2024-08-19 PROCEDURE — 97110 THERAPEUTIC EXERCISES: CPT | Mod: GP

## 2024-08-19 PROCEDURE — 80053 COMPREHEN METABOLIC PANEL: CPT

## 2024-08-19 ASSESSMENT — PAIN SCALES - GENERAL: PAINLEVEL_OUTOF10: 0 - NO PAIN

## 2024-08-19 ASSESSMENT — PAIN - FUNCTIONAL ASSESSMENT: PAIN_FUNCTIONAL_ASSESSMENT: 0-10

## 2024-08-19 NOTE — PROGRESS NOTES
Physical Therapy  Physical Therapy Treatment    Patient Name: Nadeen Nguyễn  MRN: [id number]  Today's Date: 8/19/2024  Time Calculation  Start Time: 1010  Stop Time: 1049  Time Calculation (min): 39 min    Assessment:   Pt again had no c/o with today's session, kept intensity and exercises that same to see if pt can tolerate HEP and if DOMS is reducing    Pt completed treatment with moderate effort this date    Plan:  Continue with light core strengthening       General  Chief Complaint:   1. Chronic right-sided low back pain with right-sided sciatica  Follow Up In Physical Therapy          Subjective:     Pt reports being sore again after last session     Current Problem  General  Reason for Referral: low back pain with radiculopathy  Referred By: Angelica  Past Medical History Relevant to Rehab: CAD (s/p PPM), heart failure, T2 DM, hypothyroid, HLD, CKD stage III  General Comment: visit 4    Precautions  Precautions Comment: falls, pacemaker    Performing HEP?: Yes    Pain  Pain Assessment: 0-10  0-10 (Numeric) Pain Score: 0 - No pain    Objective:     Treatments:   This therapist instructed and demonstrated interventions to patient, patient completed the following under direct supervision of this therapist:  Therapeutic Exercise:   min  39 MINUTES  Therapeutic Exercise  Therapeutic Exercise Activity 1: scit fit recumbent bicycle  Therapeutic Exercise Activity 2: seated overhead press 2# 2x25  Therapeutic Exercise Activity 3: seated horizontal abduction 2x20 yellow TB  Therapeutic Exercise Activity 4: seated thoracic extension and row single yellow tube 2x25  Therapeutic Exercise Activity 5: palloff press double yellow tube 2x30 each direction      Active       PT Problem       report 25% decrease in pain intensity in order to complete work tasks with greater ease        Start:  07/29/24    Expected End:  11/26/24            Pt will improve PANCHO score by 10% to demonstrate in order to show increased functional  mobility        Start:  07/29/24    Expected End:  11/26/24            demonstrate 2/3 a muscle grade strength increase in  b/l glutes in order to complete stair negotiation        Start:  07/29/24    Expected End:  11/26/24            increased ROM of b/l hips +10 degrees in order to improve gait mechanics        Start:  07/29/24    Expected End:  11/26/24            Pt to manage radicular symptoms with appropriate movements per POC       Start:  07/29/24    Expected End:  11/26/24                Education:   Discussed form with exercises and purpose of exercises for extension strengthening    Robinson Frost, PT

## 2024-08-20 LAB
ALBUMIN SERPL BCP-MCNC: 4.2 G/DL (ref 3.4–5)
ALP SERPL-CCNC: 100 U/L (ref 33–136)
ALT SERPL W P-5'-P-CCNC: 15 U/L (ref 7–45)
ANION GAP SERPL CALC-SCNC: 18 MMOL/L (ref 10–20)
AST SERPL W P-5'-P-CCNC: 20 U/L (ref 9–39)
BILIRUB SERPL-MCNC: 1.6 MG/DL (ref 0–1.2)
BUN SERPL-MCNC: 24 MG/DL (ref 6–23)
CALCIUM SERPL-MCNC: 9.1 MG/DL (ref 8.6–10.3)
CHLORIDE SERPL-SCNC: 100 MMOL/L (ref 98–107)
CHOLEST SERPL-MCNC: 123 MG/DL (ref 0–199)
CHOLESTEROL/HDL RATIO: 2.7
CO2 SERPL-SCNC: 24 MMOL/L (ref 21–32)
CREAT SERPL-MCNC: 0.95 MG/DL (ref 0.5–1.05)
EGFRCR SERPLBLD CKD-EPI 2021: 63 ML/MIN/1.73M*2
EST. AVERAGE GLUCOSE BLD GHB EST-MCNC: 126 MG/DL
GLUCOSE SERPL-MCNC: 130 MG/DL (ref 74–99)
HBA1C MFR BLD: 6 %
HDLC SERPL-MCNC: 45.4 MG/DL
LDLC SERPL CALC-MCNC: 47 MG/DL
NON HDL CHOLESTEROL: 78 MG/DL (ref 0–149)
POTASSIUM SERPL-SCNC: 4.3 MMOL/L (ref 3.5–5.3)
PROT SERPL-MCNC: 6.5 G/DL (ref 6.4–8.2)
SODIUM SERPL-SCNC: 138 MMOL/L (ref 136–145)
TRIGL SERPL-MCNC: 151 MG/DL (ref 0–149)
VLDL: 30 MG/DL (ref 0–40)

## 2024-08-26 ENCOUNTER — APPOINTMENT (OUTPATIENT)
Dept: PHYSICAL THERAPY | Facility: CLINIC | Age: 74
End: 2024-08-26
Payer: MEDICARE

## 2024-09-09 ENCOUNTER — TREATMENT (OUTPATIENT)
Dept: PHYSICAL THERAPY | Facility: CLINIC | Age: 74
End: 2024-09-09
Payer: MEDICARE

## 2024-09-09 DIAGNOSIS — M54.41 CHRONIC RIGHT-SIDED LOW BACK PAIN WITH RIGHT-SIDED SCIATICA: ICD-10-CM

## 2024-09-09 DIAGNOSIS — G89.29 CHRONIC RIGHT-SIDED LOW BACK PAIN WITH RIGHT-SIDED SCIATICA: ICD-10-CM

## 2024-09-09 PROCEDURE — 97110 THERAPEUTIC EXERCISES: CPT | Mod: GP

## 2024-09-09 ASSESSMENT — PAIN SCALES - GENERAL: PAINLEVEL_OUTOF10: 0 - NO PAIN

## 2024-09-09 ASSESSMENT — PAIN - FUNCTIONAL ASSESSMENT: PAIN_FUNCTIONAL_ASSESSMENT: 0-10

## 2024-09-09 NOTE — PROGRESS NOTES
Physical Therapy  Physical Therapy Treatment    Patient Name: Nadeen Nguyễn  MRN: [id number]  Today's Date: 9/9/2024  Time Calculation  Start Time: 1315  Stop Time: 1400  Time Calculation (min): 45 min    Assessment:   Pt continues to increased reps and resistance with exercises, she appears to have good inter session pain relief and evident by self rating on PANCHO    Pt completed treatment with moderate effort this date    Plan:  DC next visit       General  Chief Complaint:   1. Chronic right-sided low back pain with right-sided sciatica  Follow Up In Physical Therapy          Subjective:     Pt continues to report no pain    Current Problem  General  Reason for Referral: low back pain with radiculopathy  Referred By: Angelica  Past Medical History Relevant to Rehab: CAD (s/p PPM), heart failure, T2 DM, hypothyroid, HLD, CKD stage III  General Comment: visit 5    Precautions  Precautions Comment: falls, pacemaker    Performing HEP?: Yes    Pain  Pain Assessment: 0-10  0-10 (Numeric) Pain Score: 0 - No pain    Objective:     Oswestry Disablity Index (PANCHO): 12% self reported disability    Treatments:   This therapist instructed and demonstrated interventions to patient, patient completed the following under direct supervision of this therapist:  Therapeutic Exercise:   min  45 MINUTES  Therapeutic Exercise  Therapeutic Exercise Activity 1: scit fit recumbent bicycle  Therapeutic Exercise Activity 2: seated overhead press 2# 2x25  Therapeutic Exercise Activity 3: seated thoracic extension and row single green tube 2x25  Therapeutic Exercise Activity 4: seated horizontal abduction 2x20 red TB  Therapeutic Exercise Activity 5: seated chest fly 2# x20  Therapeutic Exercise Activity 6: palloff press double green tube 2x30 each direction  Therapeutic Exercise Activity 7: chops (no twist) single green tube x30 each direction  Therapeutic Exercise Activity 8: reverse chops (no twist) single green tube x30 each  direction      Active       PT Problem       report 25% decrease in pain intensity in order to complete work tasks with greater ease  (Progressing)       Start:  07/29/24    Expected End:  11/26/24            Pt will improve PANCHO score by 10% to demonstrate in order to show increased functional mobility  (Progressing)       Start:  07/29/24    Expected End:  11/26/24            demonstrate 2/3 a muscle grade strength increase in  b/l glutes in order to complete stair negotiation  (Progressing)       Start:  07/29/24    Expected End:  11/26/24            increased ROM of b/l hips +10 degrees in order to improve gait mechanics  (Progressing)       Start:  07/29/24    Expected End:  11/26/24            Pt to manage radicular symptoms with appropriate movements per POC (Progressing)       Start:  07/29/24    Expected End:  11/26/24                Education:   Discussed upcoming DC and progression of HEP    Robinson Frost, PT

## 2024-09-16 ENCOUNTER — TREATMENT (OUTPATIENT)
Dept: PHYSICAL THERAPY | Facility: CLINIC | Age: 74
End: 2024-09-16
Payer: MEDICARE

## 2024-09-16 ENCOUNTER — HOSPITAL ENCOUNTER (OUTPATIENT)
Dept: RADIOLOGY | Facility: HOSPITAL | Age: 74
Discharge: HOME | End: 2024-09-16
Payer: MEDICARE

## 2024-09-16 VITALS — WEIGHT: 250 LBS | HEIGHT: 61 IN | BODY MASS INDEX: 47.2 KG/M2

## 2024-09-16 DIAGNOSIS — Z12.31 ENCOUNTER FOR SCREENING MAMMOGRAM FOR MALIGNANT NEOPLASM OF BREAST: ICD-10-CM

## 2024-09-16 DIAGNOSIS — G89.29 CHRONIC RIGHT-SIDED LOW BACK PAIN WITH RIGHT-SIDED SCIATICA: ICD-10-CM

## 2024-09-16 DIAGNOSIS — M54.41 CHRONIC RIGHT-SIDED LOW BACK PAIN WITH RIGHT-SIDED SCIATICA: ICD-10-CM

## 2024-09-16 PROCEDURE — 77063 BREAST TOMOSYNTHESIS BI: CPT | Performed by: RADIOLOGY

## 2024-09-16 PROCEDURE — 97530 THERAPEUTIC ACTIVITIES: CPT | Mod: GP

## 2024-09-16 PROCEDURE — 77067 SCR MAMMO BI INCL CAD: CPT | Performed by: RADIOLOGY

## 2024-09-16 PROCEDURE — 97110 THERAPEUTIC EXERCISES: CPT | Mod: GP

## 2024-09-16 PROCEDURE — 77067 SCR MAMMO BI INCL CAD: CPT

## 2024-09-16 ASSESSMENT — PAIN SCALES - GENERAL: PAINLEVEL_OUTOF10: 0 - NO PAIN

## 2024-09-16 ASSESSMENT — PAIN - FUNCTIONAL ASSESSMENT: PAIN_FUNCTIONAL_ASSESSMENT: 0-10

## 2024-09-16 NOTE — PROGRESS NOTES
Physical Therapy  Physical Therapy Treatment    Patient Name: Nadeen Nguyễn  MRN: [id number]  Today's Date: 9/16/2024  Time Calculation  Start Time: 1309  Stop Time: 1336  Time Calculation (min): 27 min    Assessment:   Pt has made     Pt completed treatment with moderate effort this date    Plan:  DC PT       General  Chief Complaint:   1. Chronic right-sided low back pain with right-sided sciatica  Follow Up In Physical Therapy          Subjective:     Pt reports no more back pain but has an ache in the hamstring    Current Problem  General  Reason for Referral: low back pain with radiculopathy  Referred By: Angelica  Past Medical History Relevant to Rehab: CAD (s/p PPM), heart failure, T2 DM, hypothyroid, HLD, CKD stage III  General Comment: visit 6    Precautions  Precautions Comment: falls, pacemaker    Performing HEP?: Yes    Pain  Pain Assessment: 0-10  0-10 (Numeric) Pain Score: 0 - No pain    Objective:      Lumbar AROM  Lumbar flexion: (60°): 25% restriction  Lumbar extension (25°): 25% restriction  Lumbar rotation right (30°): 25% restriction  Lumbar rotation left (30°): 25% restriction  Lumbar sidebend right (25°): 25% restriction  Lumbar sidebend left (25°): 25% restriction    Hip AROM  R Hip Flexion (125°): 120  L Hip Flexion (125°): 120  R Hip Abduction (45°): 45  L Hip Abduction  (45°): 45  R Hip Extension (10°): -15  L Hip Extension (10°): -15  R hip ER: (45°): 45  L hip ER: (45°): 45  R hip IR: (45°): 30  L hip IR: (45°): 30    Lumbar Myotomes  L Hip Flex (L2): (5/5): 5/5  R Knee Ext (L3): (5/5): 5/5  L Knee Ext (L3): (5/5) : 5/5  R Ankle DF (L4): (5/5): 5/5  L Ankle DF (L4): (5/5): 5/5    Specific Lower Extremity MMT  R Gluteals (prone): (5/5): 4/5  L Gluteals (prone): (5/5): 4/5       Oswestry Disablity Index (PANCHO): 12% self reported disability    Treatments:   This therapist instructed and demonstrated interventions to patient, patient completed the following under direct supervision of this  therapist:  Therapeutic Exercise:   min  12 MINUTES  Therapeutic Exercise  Therapeutic Exercise Activity 2: seated overhead press  Therapeutic Exercise Activity 3: seated thoracic extension and row single green tube  Therapeutic Exercise Activity 4: palloff press double green tube 2x30 each direction  Therapeutic Exercise Activity 5: chops (no twist) single green tube x30 each direction  Therapeutic Exercise Activity 6: reverse chops (no twist) single green tube x30 each direction  Therapeutic Exercise Activity 7: glute bridge, hip adduction ISO, hip abduction ISO  Therapeutic Activity:      15 MINUTES  Therapeutic Activity  Therapeutic Activity 1: discussed HEP, reviewed exercises, re-check for possible HS strain      Active       PT Problem       report 25% decrease in pain intensity in order to complete work tasks with greater ease  (Progressing)       Start:  07/29/24    Expected End:  11/26/24            Pt will improve PANCHO score by 10% to demonstrate in order to show increased functional mobility  (Progressing)       Start:  07/29/24    Expected End:  11/26/24            demonstrate 2/3 a muscle grade strength increase in  b/l glutes in order to complete stair negotiation  (Progressing)       Start:  07/29/24    Expected End:  11/26/24            increased ROM of b/l hips +10 degrees in order to improve gait mechanics  (Progressing)       Start:  07/29/24    Expected End:  11/26/24            Pt to manage radicular symptoms with appropriate movements per POC (Progressing)       Start:  07/29/24    Expected End:  11/26/24                Education:   Discussed continuing HEP and adding more resistance as tolerated    Robinson Frost, PT

## 2024-09-17 ENCOUNTER — APPOINTMENT (OUTPATIENT)
Dept: PAIN MEDICINE | Facility: CLINIC | Age: 74
End: 2024-09-17
Payer: MEDICARE

## 2024-09-17 VITALS
WEIGHT: 232.2 LBS | DIASTOLIC BLOOD PRESSURE: 82 MMHG | HEART RATE: 90 BPM | SYSTOLIC BLOOD PRESSURE: 127 MMHG | BODY MASS INDEX: 43.87 KG/M2

## 2024-09-17 DIAGNOSIS — M48.062 SPINAL STENOSIS OF LUMBAR REGION WITH NEUROGENIC CLAUDICATION: Primary | ICD-10-CM

## 2024-09-17 DIAGNOSIS — M47.816 LUMBAR SPONDYLOSIS: ICD-10-CM

## 2024-09-17 PROCEDURE — 4010F ACE/ARB THERAPY RXD/TAKEN: CPT | Performed by: PHYSICAL MEDICINE & REHABILITATION

## 2024-09-17 PROCEDURE — 3044F HG A1C LEVEL LT 7.0%: CPT | Performed by: PHYSICAL MEDICINE & REHABILITATION

## 2024-09-17 PROCEDURE — 99214 OFFICE O/P EST MOD 30 MIN: CPT | Performed by: PHYSICAL MEDICINE & REHABILITATION

## 2024-09-17 PROCEDURE — 1123F ACP DISCUSS/DSCN MKR DOCD: CPT | Performed by: PHYSICAL MEDICINE & REHABILITATION

## 2024-09-17 PROCEDURE — 3074F SYST BP LT 130 MM HG: CPT | Performed by: PHYSICAL MEDICINE & REHABILITATION

## 2024-09-17 PROCEDURE — 3079F DIAST BP 80-89 MM HG: CPT | Performed by: PHYSICAL MEDICINE & REHABILITATION

## 2024-09-17 PROCEDURE — 3048F LDL-C <100 MG/DL: CPT | Performed by: PHYSICAL MEDICINE & REHABILITATION

## 2024-09-17 NOTE — PROGRESS NOTES
Subjective   Patient ID: Nadeen Nguyễn is a 74 y.o. female who presents for No chief complaint on file..  HPI    74-year-old female with lower back pain with bilateral lower extremity claudication symptoms.  She has significant degenerative changes seen on x-ray.  She has completed physical therapy over the last 2 months with some improvement in her symptoms.  She is also taking gabapentin 300 morning and 600 at night,  She does try to take with 300 in the afternoon but sometimes forgets.  She is still having difficulty with walking especially stairs.  She does have a pacemaker that is not MRI compatible.  She would be open to injections as well.                    Monitoring and compliance:    ORT:    PDUQ:    Office Agreement:      Review of Systems     Current Outpatient Medications   Medication Instructions    ascorbic acid (VITAMIN C) 1,000 mg, oral, Daily    aspirin 81 mg EC tablet 1 tablet, oral, Daily    atorvastatin (LIPITOR) 40 mg, oral, Daily    calcium citrate (Calcitrate) 200 mg (950 mg) tablet Take 2 tablets (400 mg) by mouth 2 times a day.    carvedilol (COREG) 12.5 mg, oral, 2 times daily    cholecalciferol (VITAMIN D-3) 50 mcg, oral, Daily    coenzyme Q-10 200 mg, oral, Daily    cyanocobalamin, vitamin B-12, 2,500 mcg tablet, sublingual SL tablet 1 tablet, sublingual, 2 times weekly, Monday and Friday    DULoxetine (CYMBALTA) 60 mg, oral, Daily, Do not crush or chew.    gabapentin (Neurontin) 300 mg capsule Day 1 Take 300mg at bedtime, Day 2 300mg BID, Day 3 300mg TID, Day 4-6 300mg AM 300mg afternoon 600mg at bedtime, Day 7-9 300mg Am, 600mg afternoon, 600mg at bedtime, Day 10 and after 600mg TID.    levothyroxine (Synthroid, Levoxyl) 137 mcg tablet TAKE 1 TABLET DAILY    lisinopril 2.5 mg, oral, Daily    magnesium oxide 500 mg tablet 1 tablet, oral, Daily    metFORMIN (GLUCOPHAGE) 500 mg, oral, 2 times daily (morning and late afternoon)    multivitamin-iron-minerals-folic acid (Multivitamin 50  Plus) tablet 1 tablet, oral, Daily    SITagliptin phos-metformin (Janumet)  mg tablet 1 tablet, oral, Daily    spironolactone (ALDACTONE) 12.5 mg, oral, 2 times daily    torsemide (Demadex) 20 mg tablet 1 tablet, oral, Daily        Past Medical History:   Diagnosis Date    Angina pectoris (CMS-[facility]) 08/15/2023    Atherosclerotic heart disease of native coronary artery with other forms of angina pectoris (CMS-[facility]) 08/15/2023    Cardiac defibrillator in place 08/15/2023    Cardiomyopathy (Multi) 08/15/2023    Coronary artery disease 08/15/2023    Encounter for screening mammogram for malignant neoplasm of breast 04/11/2016    Visit for screening mammogram    Heart failure (Multi) 08/15/2023    Hyperlipidemia 08/15/2023    Hypertension 08/15/2023    Hypothyroidism 08/15/2023    Mixed hyperlipidemia 08/15/2023    Obstructive sleep apnea 08/15/2023    Personal history of other diseases of the nervous system and sense organs     History of sleep apnea    Spinal stenosis, lumbar region without neurogenic claudication     Lumbar canal stenosis    Spondylosis without myelopathy or radiculopathy, cervical region 10/29/2015    Spondylosis of cervical region without myelopathy or radiculopathy    Type 2 diabetes mellitus (Multi) 08/15/2023    Ventricular fibrillation (Multi) 08/15/2023    Ventricular tachycardia (Multi) 08/15/2023        Past Surgical History:   Procedure Laterality Date    ABLATION OF DYSRHYTHMIC FOCUS      CARDIAC CATHETERIZATION      CARDIAC ELECTROPHYSIOLOGY PROCEDURE N/A 12/15/2023    Procedure: ICD UPGRADE BIV;  Surgeon: Anselmo Lilly MD;  Location: [place] [address] Cardiac [facility] Lab;  Service: Electrophysiology;  Laterality: N/A;  [id number] upgrade to BIV/ICD from dual chamber ICD [facility] [facility] [facility]    CARDIAC PACEMAKER PLACEMENT  04/11/2016    Pacemaker Placement    CARDIAC PACEMAKER PLACEMENT  06/14/2013    Pacemaker Placement    CHOLECYSTECTOMY  03/21/2013    Cholecystectomy    COLONOSCOPY   10/09/2019    Complete Colonoscopy    CORONARY STENT PLACEMENT      GALLBLADDER SURGERY  06/18/2013    Gallbladder Surgery    OTHER SURGICAL HISTORY  11/22/2013    Gastric Surgery For Morbid Obesity Laparoscopic Longitudinal Gastrectomy    TONSILLECTOMY  06/18/2013    Tonsillectomy        Family History   Problem Relation Name Age of Onset    Colon cancer Mother Lisa Nguyễn     Hypertension Mother Lisa Nguyễn     Diabetes Mother Lisa Nguyễn     Cancer Mother Lisa Nguyễn     Stroke Father Navneet Nguyễn     Heart disease Father Navneet Nguyễn     Diabetes type II Maternal Grandmother Melissa Nunez     Diabetes type II Mother's Sister Raquel Torres     Heart attack Mother's Brother Juan Nunez     Hypertension Mother's Brother Juan Nunez         Allergies   Allergen Reactions    Azithromycin Other and Unknown    Erythromycin Unknown    Nsaids (Non-Steroidal Anti-Inflammatory Drug) Unknown        Objective     Vitals:    09/17/24 0917   BP: 127/82   Pulse: 90        Physical Exam    GENERAL EXAM  Vital Signs: Vital signs to include heart rate, respiration rate, blood pressure, and temperature were reviewed.  General Appearance:  Awake, alert, healthy appearing, well developed, No acute distress.  Head: Normocephalic without evidence of head injury.  Neck: The appearance of the neck was normal without swelling with a midline trachea.  Eyes: The eyelids and eyebrows exhibited no abnormalities.  Pupils were not pin-point.  Sclera was without icterus.  Lungs: Respiration rhythm and depth was normal.  Respiratory movements were normal without labored breathing.  Cardiovascular: No peripheral edema was present.    Neurological: Patient was oriented to time, place, and person.  Speech was normal.  Balance, gait, and stance were unremarkable.    Psychiatric: Appearance was normal with appropriate dress.  Mood was euthymic and affect was normal.  Skin: Affected regions were without ecchymosis or skin  lesions.        Assessment/Plan   Diagnoses and all orders for this visit:  Spinal stenosis of lumbar region with neurogenic claudication  -     Protime-INR; Future  -     CBC; Future  -     CT lumbar spine post myelogram; Future  -     FL lumbar spine myelogram with lumbar puncture; Future  Lumbar spondylosis    74-year-old female with back pain with lower extremity claudication symptoms.  Survey of her lumbar spine showed severe spondylosis throughout.  She has completed physical therapy and is still having symptoms so would be reasonable to move forward with more advanced imaging.  Patient unfortunately has non MRI compatible pacemaker so we will need to proceed with CT myelogram.  Last GFR was 63.  Plan for today:  - Continue with home exercises and physical therapy.  - Continue with duloxetine and gabapentin.  - CT myelogram, I will order an INR and CBC to be done within 30 days of the myelogram.  Patient will need to hold her aspirin for 6 days prior to the procedure.  - Will plan on scheduling patient for an epidural steroid injection after obtaining results of the CT myelogram.  I did instruct patient to call us and let us know when she has it completed I will take a look at it we can get her directly scheduled for the injection.  We discussed the risks, benefits and alternatives to the procedure and the patient would like to proceed        This note was generated with the aid of dictation software, there may be typos despite my attempts at proofreading.

## 2024-09-17 NOTE — PATIENT INSTRUCTIONS
You are going to be having a CT myelogram which is a specialized CT utilizing contrast dye.  There are 2 labs that need to be done within 30 days of the CT myelogram.  In addition you will need to hold your aspirin for 6 days prior to the CT myelogram.

## 2024-09-26 ENCOUNTER — LAB (OUTPATIENT)
Dept: LAB | Facility: LAB | Age: 74
End: 2024-09-26
Payer: MEDICARE

## 2024-09-26 DIAGNOSIS — M48.062 SPINAL STENOSIS OF LUMBAR REGION WITH NEUROGENIC CLAUDICATION: ICD-10-CM

## 2024-09-26 LAB
ERYTHROCYTE [DISTWIDTH] IN BLOOD BY AUTOMATED COUNT: 13.1 % (ref 11.5–14.5)
HCT VFR BLD AUTO: 43.8 % (ref 36–46)
HGB BLD-MCNC: 14.4 G/DL (ref 12–16)
INR PPP: 1 (ref 0.9–1.1)
MCH RBC QN AUTO: 29.2 PG (ref 26–34)
MCHC RBC AUTO-ENTMCNC: 32.9 G/DL (ref 32–36)
MCV RBC AUTO: 89 FL (ref 80–100)
NRBC BLD-RTO: 0 /100 WBCS (ref 0–0)
PLATELET # BLD AUTO: 254 X10*3/UL (ref 150–450)
PROTHROMBIN TIME: 11.3 SECONDS (ref 9.8–12.8)
RBC # BLD AUTO: 4.93 X10*6/UL (ref 4–5.2)
WBC # BLD AUTO: 11 X10*3/UL (ref 4.4–11.3)

## 2024-09-26 PROCEDURE — 36415 COLL VENOUS BLD VENIPUNCTURE: CPT

## 2024-10-03 ENCOUNTER — HOSPITAL ENCOUNTER (OUTPATIENT)
Dept: RADIOLOGY | Facility: HOSPITAL | Age: 74
Discharge: HOME | End: 2024-10-03
Payer: MEDICARE

## 2024-10-03 VITALS
HEIGHT: 61 IN | OXYGEN SATURATION: 97 % | WEIGHT: 245 LBS | SYSTOLIC BLOOD PRESSURE: 117 MMHG | TEMPERATURE: 97.3 F | RESPIRATION RATE: 16 BRPM | HEART RATE: 71 BPM | DIASTOLIC BLOOD PRESSURE: 48 MMHG | BODY MASS INDEX: 46.26 KG/M2

## 2024-10-03 DIAGNOSIS — M48.062 SPINAL STENOSIS OF LUMBAR REGION WITH NEUROGENIC CLAUDICATION: ICD-10-CM

## 2024-10-03 PROCEDURE — 7100000010 HC PHASE TWO TIME - EACH INCREMENTAL 1 MINUTE

## 2024-10-03 PROCEDURE — 2500000004 HC RX 250 GENERAL PHARMACY W/ HCPCS (ALT 636 FOR OP/ED): Performed by: PHYSICAL MEDICINE & REHABILITATION

## 2024-10-03 PROCEDURE — 2550000001 HC RX 255 CONTRASTS: Performed by: PHYSICAL MEDICINE & REHABILITATION

## 2024-10-03 PROCEDURE — 72132 CT LUMBAR SPINE W/DYE: CPT | Performed by: RADIOLOGY

## 2024-10-03 PROCEDURE — 72132 CT LUMBAR SPINE W/DYE: CPT

## 2024-10-03 PROCEDURE — 62304 MYELOGRAPHY LUMBAR INJECTION: CPT

## 2024-10-03 PROCEDURE — 62304 MYELOGRAPHY LUMBAR INJECTION: CPT | Performed by: RADIOLOGY

## 2024-10-03 PROCEDURE — 7100000009 HC PHASE TWO TIME - INITIAL BASE CHARGE

## 2024-10-03 RX ORDER — LIDOCAINE HYDROCHLORIDE 10 MG/ML
5 INJECTION, SOLUTION EPIDURAL; INFILTRATION; INTRACAUDAL; PERINEURAL ONCE
Status: COMPLETED | OUTPATIENT
Start: 2024-10-03 | End: 2024-10-03

## 2024-10-03 ASSESSMENT — PAIN SCALES - GENERAL
PAINLEVEL_OUTOF10: 0 - NO PAIN

## 2024-10-03 ASSESSMENT — PAIN - FUNCTIONAL ASSESSMENT
PAIN_FUNCTIONAL_ASSESSMENT: 0-10

## 2024-10-03 NOTE — PRE-PROCEDURE NOTE
Interventional Radiology Preprocedure Note    Indication for procedure: The encounter diagnosis was Spinal stenosis of lumbar region with neurogenic claudication.    Relevant review of systems: NA    Relevant Labs:   Lab Results   Component Value Date    CREATININE 0.95 08/19/2024    EGFR 63 08/19/2024    INR 1.0 09/26/2024    PROTIME 11.3 09/26/2024       Planned Sedation/Anesthesia: Local    Airway assessment: normal    Directed physical examination:    Physical Exam  HENT:      Head: Normocephalic.   Cardiovascular:      Rate and Rhythm: Normal rate.   Pulmonary:      Effort: Pulmonary effort is normal.   Neurological:      Mental Status: She is alert.          Mallampati: III (soft and hard palate and base of uvula visible)    ASA Score: ASA 2 - Patient with mild systemic disease with no functional limitations    Benefits, risks and alternatives of procedure and planned sedation have been discussed with the patient and/or their representative. All questions answered and they agree to proceed.

## 2024-10-03 NOTE — POST-PROCEDURE NOTE
Interventional Radiology Brief Postprocedure Note    Attending: Dr. Harden    Assistant: Dr. Benson    Diagnosis: Neurogenic claudication    Description of procedure: Successful fluoroscopy-guided lumbar puncture with myelogram. A total of 10 mL Omnipaque 300 was injected intrathecally at the left L4/5 interlaminar space using a 22G 6 inch needle. Patient was then transported to CT for CT myelogram portion. Please see PACS report for further details.     Anesthesia:  Local    Complications: None    Estimated Blood Loss: minimal    Medications (Filter: Administrations occurring from 1128 to 1128 on 10/03/24) As of 10/03/24 1128      None          No specimens collected      See detailed result report with images in PACS.    The patient tolerated the procedure well without incident or complication and is in stable condition.

## 2024-10-03 NOTE — DISCHARGE INSTRUCTIONS
Discharge information    See Myelogram patient information sheet.     Ok to remove dressing on 10/4/24 at 10am.  Ok to shower on 10/4/24, no baths, jacuzzis, or swimming. Do not get submerged in a body of water (leads to increased risk of infection.)  Ok to keep open until healed. Healing is when a scab is created and falls off, usually within 5-10 days.   Sleep with your head raised for tonight.   Drink lots of water.    Look for signs and symptoms of infection:  Including: redness, swelling, discharge such as pus, or odor from site.    Look for bleeding from site:  If bleeding occurs hold pressure for 5 minutes with paper towel, check site if still bleeding hold for 5 more minutes  If site continues to bleed after 10 minutes call 911.    For questions related to your procedure:  Please call [phone] between the hours of 7:00am-5:00pm Monday through Friday.  Please call [phone] for Resident on call after 5:00pm weeknights, on weekends, and holidays.     In the event of an emergency call 911 or go to your nearest emergency room.

## 2024-10-07 ENCOUNTER — PREP FOR PROCEDURE (OUTPATIENT)
Dept: PAIN MEDICINE | Facility: HOSPITAL | Age: 74
End: 2024-10-07
Payer: MEDICARE

## 2024-10-07 DIAGNOSIS — M48.062 SPINAL STENOSIS OF LUMBAR REGION WITH NEUROGENIC CLAUDICATION: Primary | ICD-10-CM

## 2024-10-07 RX ORDER — DIAZEPAM 5 MG/1
5 TABLET ORAL ONCE AS NEEDED
OUTPATIENT
Start: 2024-10-07

## 2024-10-08 ENCOUNTER — DOCUMENTATION (OUTPATIENT)
Dept: PHYSICAL THERAPY | Facility: CLINIC | Age: 74
End: 2024-10-08
Payer: MEDICARE

## 2024-10-08 NOTE — PROGRESS NOTES
Physical Therapy    Discharge Summary    Name: Nadeen Nguyễn  MRN: [id number]  : 1950  Date: 10/08/24    Discharge Summary: PT    Discharge Information: Date of discharge 10/8/2024, Date of last visit 2024, Date of evaluation 2024, Number of attended visits 6, Referred by Ray, and Referred for LBP    Therapy Summary: pt quickly made improvement in radicular pain originating from her low back, she was able to perform all HEP exercises without pain and can now complete her households chores and tasks without pain    Discharge Status: improved functional status     Rehab Discharge Reason: Achieved all and/or the most significant goals(s)

## 2024-10-15 ENCOUNTER — HOSPITAL ENCOUNTER (OUTPATIENT)
Dept: CARDIOLOGY | Facility: CLINIC | Age: 74
Discharge: HOME | End: 2024-10-15
Payer: MEDICARE

## 2024-10-15 DIAGNOSIS — I42.9 CARDIOMYOPATHY, UNSPECIFIED TYPE (MULTI): ICD-10-CM

## 2024-10-21 ENCOUNTER — HOSPITAL ENCOUNTER (OUTPATIENT)
Dept: CARDIOLOGY | Facility: CLINIC | Age: 74
Discharge: HOME | End: 2024-10-21
Payer: MEDICARE

## 2024-10-21 DIAGNOSIS — I42.9 CARDIOMYOPATHY, UNSPECIFIED TYPE (MULTI): ICD-10-CM

## 2024-10-21 PROCEDURE — 93295 DEV INTERROG REMOTE 1/2/MLT: CPT | Performed by: INTERNAL MEDICINE

## 2024-10-21 PROCEDURE — 93296 REM INTERROG EVL PM/IDS: CPT

## 2024-10-24 DIAGNOSIS — E11.9 TYPE 2 DIABETES MELLITUS WITHOUT COMPLICATION, WITHOUT LONG-TERM CURRENT USE OF INSULIN (MULTI): ICD-10-CM

## 2024-10-24 RX ORDER — SITAGLIPTIN AND METFORMIN HYDROCHLORIDE 500; 50 MG/1; MG/1
1 TABLET, FILM COATED ORAL DAILY
Qty: 90 TABLET | Refills: 1 | Status: SHIPPED | OUTPATIENT
Start: 2024-10-24

## 2024-10-24 RX ORDER — METFORMIN HYDROCHLORIDE 500 MG/1
500 TABLET ORAL
Qty: 180 TABLET | Refills: 1 | Status: SHIPPED | OUTPATIENT
Start: 2024-10-24

## 2024-10-28 ENCOUNTER — TELEPHONE (OUTPATIENT)
Dept: PAIN MEDICINE | Facility: CLINIC | Age: 74
End: 2024-10-28
Payer: MEDICARE

## 2024-10-31 DIAGNOSIS — I50.32 CHRONIC DIASTOLIC HEART FAILURE: ICD-10-CM

## 2024-11-01 ENCOUNTER — HOSPITAL ENCOUNTER (OUTPATIENT)
Dept: CARDIOLOGY | Facility: CLINIC | Age: 74
Discharge: HOME | End: 2024-11-01
Payer: MEDICARE

## 2024-11-01 DIAGNOSIS — I42.9 CARDIOMYOPATHY, UNSPECIFIED TYPE (MULTI): ICD-10-CM

## 2024-11-01 RX ORDER — TORSEMIDE 20 MG/1
20 TABLET ORAL DAILY
Qty: 90 TABLET | Refills: 3 | Status: SHIPPED | OUTPATIENT
Start: 2024-11-01 | End: 2025-10-27

## 2024-11-04 ENCOUNTER — HOSPITAL ENCOUNTER (OUTPATIENT)
Dept: CARDIOLOGY | Facility: CLINIC | Age: 74
Discharge: HOME | End: 2024-11-04
Payer: MEDICARE

## 2024-11-04 ENCOUNTER — APPOINTMENT (OUTPATIENT)
Dept: PRIMARY CARE | Facility: CLINIC | Age: 74
End: 2024-11-04
Payer: MEDICARE

## 2024-11-04 VITALS
WEIGHT: 233.2 LBS | OXYGEN SATURATION: 98 % | HEART RATE: 78 BPM | SYSTOLIC BLOOD PRESSURE: 106 MMHG | DIASTOLIC BLOOD PRESSURE: 52 MMHG | HEIGHT: 61 IN | BODY MASS INDEX: 44.03 KG/M2

## 2024-11-04 DIAGNOSIS — Z78.0 POST-MENOPAUSAL: ICD-10-CM

## 2024-11-04 DIAGNOSIS — I10 PRIMARY HYPERTENSION: Primary | ICD-10-CM

## 2024-11-04 DIAGNOSIS — I42.9 CARDIOMYOPATHY, UNSPECIFIED TYPE (MULTI): ICD-10-CM

## 2024-11-04 DIAGNOSIS — R42 DIZZINESS: ICD-10-CM

## 2024-11-04 DIAGNOSIS — M47.816 LUMBAR SPONDYLOSIS: ICD-10-CM

## 2024-11-04 PROBLEM — J01.90 ACUTE SINUSITIS: Status: RESOLVED | Noted: 2024-05-23 | Resolved: 2024-11-04

## 2024-11-04 PROBLEM — R06.02 SHORTNESS OF BREATH: Status: RESOLVED | Noted: 2023-08-15 | Resolved: 2024-11-04
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
## 2024-11-04 PROCEDURE — 3044F HG A1C LEVEL LT 7.0%: CPT | Performed by: STUDENT IN AN ORGANIZED HEALTH CARE EDUCATION/TRAINING PROGRAM

## 2024-11-04 PROCEDURE — 1160F RVW MEDS BY RX/DR IN RCRD: CPT | Performed by: STUDENT IN AN ORGANIZED HEALTH CARE EDUCATION/TRAINING PROGRAM

## 2024-11-04 PROCEDURE — 3008F BODY MASS INDEX DOCD: CPT | Performed by: STUDENT IN AN ORGANIZED HEALTH CARE EDUCATION/TRAINING PROGRAM

## 2024-11-04 PROCEDURE — 99214 OFFICE O/P EST MOD 30 MIN: CPT | Performed by: STUDENT IN AN ORGANIZED HEALTH CARE EDUCATION/TRAINING PROGRAM

## 2024-11-04 PROCEDURE — 3048F LDL-C <100 MG/DL: CPT | Performed by: STUDENT IN AN ORGANIZED HEALTH CARE EDUCATION/TRAINING PROGRAM

## 2024-11-04 PROCEDURE — 4010F ACE/ARB THERAPY RXD/TAKEN: CPT | Performed by: STUDENT IN AN ORGANIZED HEALTH CARE EDUCATION/TRAINING PROGRAM

## 2024-11-04 PROCEDURE — 1159F MED LIST DOCD IN RCRD: CPT | Performed by: STUDENT IN AN ORGANIZED HEALTH CARE EDUCATION/TRAINING PROGRAM

## 2024-11-04 PROCEDURE — 1123F ACP DISCUSS/DSCN MKR DOCD: CPT | Performed by: STUDENT IN AN ORGANIZED HEALTH CARE EDUCATION/TRAINING PROGRAM

## 2024-11-04 PROCEDURE — 3074F SYST BP LT 130 MM HG: CPT | Performed by: STUDENT IN AN ORGANIZED HEALTH CARE EDUCATION/TRAINING PROGRAM

## 2024-11-04 PROCEDURE — G2211 COMPLEX E/M VISIT ADD ON: HCPCS | Performed by: STUDENT IN AN ORGANIZED HEALTH CARE EDUCATION/TRAINING PROGRAM

## 2024-11-04 PROCEDURE — 3078F DIAST BP <80 MM HG: CPT | Performed by: STUDENT IN AN ORGANIZED HEALTH CARE EDUCATION/TRAINING PROGRAM

## 2024-11-04 NOTE — PATIENT INSTRUCTIONS
If your blood pressure is under 100mmHg on the top please hold next dose of your blood pressure medications.

## 2024-11-04 NOTE — PROGRESS NOTES
Subjective   Patient ID: Nadeen Nguyễn is a 74 y.o. female who presents for Follow-up (Pt is here for a 5 mo follow up.).  HPI  Epidural injection in lumbar spine this week    MD Cinthia-carpal tunnel symptoms are being followed    Dizziness with standing  Concerns for orthostatic hypotension   Increase hydration   We decreased her carvedilol 12.5 mg     Objective   Physical Exam  Vitals reviewed.   Constitutional:       Appearance: Normal appearance.   Cardiovascular:      Rate and Rhythm: Normal rate and regular rhythm.      Heart sounds: No murmur heard.  Pulmonary:      Effort: Pulmonary effort is normal. No respiratory distress.      Breath sounds: Normal breath sounds. No wheezing.   Musculoskeletal:      Cervical back: Neck supple.      Left lower leg: No edema.   Neurological:      Mental Status: She is alert.         Assessment/Plan   Diagnoses and all orders for this visit:  Post-menopausal  -     XR DEXA bone density; Future  Lumbar spondylosis  Primary hypertension    Nadeen Nunez is a 74-year-old female with hypertension, diabetes, CAD status post pacemaker placement, hyperlipidemia, hypothyroidism and obesity who presents today for 5 month follow up.      Lumbar Stenosis with claudication   Pending injection steroid   working with Pain management   Continue with home exercises and physical therapy.  Continue with duloxetine and gabapentin.  CT myelogram    DM  A1C 6.0% which is down from 7.1% 8/23  Janumet  mg daily   Metformin 500mg BID (added due to cost for pt)  Lisinopril 2.5mg daily   Atorvastatin 40mg daily      HTN  74/40mmHg Pt is symptomatic improved to 90/56 mmHg   Pt advised to hold night time medications today   Hold medications for BP <100 mmHg systolic   Lisinopril 2.5mg   Spironolactone 25mg daily   Decrease Carvedilol 12.5 mg BID  Physical exam was stable. Discussed maintaining diets such as DASH to help maintain normal Blood pressure. Encouraged regular exercise for a total of  120 min weekly. We will fu labs in 1-3 days. For now there will be no change in medical management. All questions and concerns were addressed. Pt will follow up in 4-6 months or sooner if needed.     Carpal Tunnel   S/p EMG mild carpal tunnel   Pending re-evaluation with Dr. Vicente     Left sciatic Pain   Refractory to stretching and exercises   Referral to pain management for injections given      CAD s/p pacemaker placement   Hx CABG in 2007, PCI coronary  artery 2008.   S/p CS lead revesion for high threshold, she started having HF sx after losing synchronization  , she had a  new LBP  lead placement  Working well      DSL  continue atorvastatin 40mg daily   Lipid panel ordered today     CKD  Stage IIIA GFR 58   stable  ACE and SGLT-2 inhibitor on board   Continue to monitor      Hypothyroidism   continue levothyroxine 137mcg daily      Obesity   see DM for plan   Pt is down 12lbs this past month   Notes decrease in appetite since metformin usage   Cut back on portion control      NOEL and Obesity (AHI 54 on 2012) on CPAP-12 and morbid obesity, S/P Bariatric surgery 2014  Body mass index is 48>46  Using vitamins due to bariatric surgery  Diet and Physical exercise recommendation  She has a pulmonologist       Jennifer Osorio, DO 11/15/24 7:19 PM

## 2024-11-06 ENCOUNTER — HOSPITAL ENCOUNTER (OUTPATIENT)
Dept: OPERATING ROOM | Facility: CLINIC | Age: 74
Setting detail: OUTPATIENT SURGERY
Discharge: HOME | End: 2024-11-06
Payer: MEDICARE

## 2024-11-06 ENCOUNTER — APPOINTMENT (OUTPATIENT)
Dept: CARDIOLOGY | Facility: CLINIC | Age: 74
End: 2024-11-06
Payer: MEDICARE

## 2024-11-06 VITALS
WEIGHT: 233 LBS | RESPIRATION RATE: 16 BRPM | BODY MASS INDEX: 43.99 KG/M2 | DIASTOLIC BLOOD PRESSURE: 61 MMHG | HEIGHT: 61 IN | OXYGEN SATURATION: 97 % | SYSTOLIC BLOOD PRESSURE: 124 MMHG | HEART RATE: 85 BPM | TEMPERATURE: 97.5 F

## 2024-11-06 DIAGNOSIS — M48.062 SPINAL STENOSIS OF LUMBAR REGION WITH NEUROGENIC CLAUDICATION: ICD-10-CM

## 2024-11-06 PROCEDURE — 3600000001 HC OR TIME - INITIAL BASE CHARGE - PROCEDURE LEVEL ONE

## 2024-11-06 PROCEDURE — 7100000009 HC PHASE TWO TIME - INITIAL BASE CHARGE

## 2024-11-06 PROCEDURE — 62323 NJX INTERLAMINAR LMBR/SAC: CPT | Performed by: PHYSICAL MEDICINE & REHABILITATION

## 2024-11-06 PROCEDURE — 7100000010 HC PHASE TWO TIME - EACH INCREMENTAL 1 MINUTE

## 2024-11-06 PROCEDURE — 3600000006 HC OR TIME - EACH INCREMENTAL 1 MINUTE - PROCEDURE LEVEL ONE

## 2024-11-06 PROCEDURE — 2500000004 HC RX 250 GENERAL PHARMACY W/ HCPCS (ALT 636 FOR OP/ED): Performed by: PHYSICAL MEDICINE & REHABILITATION

## 2024-11-06 PROCEDURE — 2550000001 HC RX 255 CONTRASTS: Performed by: PHYSICAL MEDICINE & REHABILITATION

## 2024-11-06 RX ORDER — METHYLPREDNISOLONE ACETATE 40 MG/ML
INJECTION, SUSPENSION INTRA-ARTICULAR; INTRALESIONAL; INTRAMUSCULAR; SOFT TISSUE AS NEEDED
Status: COMPLETED | OUTPATIENT
Start: 2024-11-06 | End: 2024-11-06

## 2024-11-06 RX ORDER — LIDOCAINE HYDROCHLORIDE 5 MG/ML
INJECTION, SOLUTION INFILTRATION; INTRAVENOUS AS NEEDED
Status: COMPLETED | OUTPATIENT
Start: 2024-11-06 | End: 2024-11-06

## 2024-11-06 ASSESSMENT — PAIN SCALES - GENERAL
PAINLEVEL_OUTOF10: 0 - NO PAIN
PAINLEVEL_OUTOF10: 4

## 2024-11-06 ASSESSMENT — PAIN - FUNCTIONAL ASSESSMENT
PAIN_FUNCTIONAL_ASSESSMENT: 0-10
PAIN_FUNCTIONAL_ASSESSMENT: 0-10

## 2024-11-06 ASSESSMENT — COLUMBIA-SUICIDE SEVERITY RATING SCALE - C-SSRS
1. IN THE PAST MONTH, HAVE YOU WISHED YOU WERE DEAD OR WISHED YOU COULD GO TO SLEEP AND NOT WAKE UP?: NO
6. HAVE YOU EVER DONE ANYTHING, STARTED TO DO ANYTHING, OR PREPARED TO DO ANYTHING TO END YOUR LIFE?: NO
2. HAVE YOU ACTUALLY HAD ANY THOUGHTS OF KILLING YOURSELF?: NO

## 2024-11-06 NOTE — DISCHARGE INSTRUCTIONS
Discharge Instructions for Anesthesiology Pain Service Patients - Dr. Dominguez    Observe the needle site for excessive bleeding (slow general oozing that completely soaks the dressing or fresh bright red bleeding).  In either case, apply pressure to the area, elevate it if possible and call your doctor at once.    Observe/monitor for the following signs and symptoms:         Needle site:  change in color, numbness or tingling, coldness to touch, swelling, drainage       Temperature of 101.5 or higher       Increased or uncontrollable pain    If you notice any of the above signs or symptoms, please call your doctor at once.    Your pain may not be gone immediately after this procedure; it generally takes 3 to 5 days for the steroid to work.    Keep the needle site clean and dry for 24 hours.    Continue your present medications.    No driving or operating machinery for 24 hours if you have received sedation.    Make an appointment to see you doctor in 2-3 weeks    Central Scheduling Number:  [phone]  Dr. Dominguez's office:  [phone]  Dr. Dominguez's Nurse line:  [phone]  After hours Pain Management:  [phone].    If any problems occur, or if you have further questions, please call you doctor as soon as possible.  If you find that you cannot reach your doctor, but feel that your condition needs a doctors attention, go to the  emergency room nearest your home.

## 2024-11-06 NOTE — H&P
HISTORY AND PHYSICAL    History Of Present Illness  Nadeen Nguyễn is a 74 y.o. female presenting with chronic pain here for procedure as stated below. Patient denies any changes to health since the last visit to our clinic.    Past Medical History  Past Medical History:   Diagnosis Date    Angina pectoris 08/15/2023    Atherosclerotic heart disease of native coronary artery with other forms of angina pectoris 08/15/2023    Cardiac defibrillator in place 08/15/2023    Cardiomyopathy 08/15/2023    Coronary artery disease 08/15/2023    Encounter for screening mammogram for malignant neoplasm of breast 04/11/2016    Visit for screening mammogram    Heart failure 08/15/2023    Hyperlipidemia 08/15/2023    Hypertension 08/15/2023    Hypothyroidism 08/15/2023    Mixed hyperlipidemia 08/15/2023    Obstructive sleep apnea 08/15/2023    Personal history of other diseases of the nervous system and sense organs     History of sleep apnea    Spinal stenosis, lumbar region without neurogenic claudication     Lumbar canal stenosis    Spondylosis without myelopathy or radiculopathy, cervical region 10/29/2015    Spondylosis of cervical region without myelopathy or radiculopathy    Type 2 diabetes mellitus 08/15/2023    Ventricular fibrillation (Multi) 08/15/2023    Ventricular tachycardia (Multi) 08/15/2023       Surgical History  Past Surgical History:   Procedure Laterality Date    ABLATION OF DYSRHYTHMIC FOCUS      CARDIAC CATHETERIZATION      CARDIAC ELECTROPHYSIOLOGY PROCEDURE N/A 12/15/2023    Procedure: ICD UPGRADE BIV;  Surgeon: Anselmo Lilly MD;  Location: [place] [address] Cardiac [facility] Lab;  Service: Electrophysiology;  Laterality: N/A;  [id number] upgrade to BIV/ICD from dual chamber ICD [facility] [facility]    CARDIAC PACEMAKER PLACEMENT  04/11/2016    Pacemaker Placement    CARDIAC PACEMAKER PLACEMENT  06/14/2013    Pacemaker Placement    CHOLECYSTECTOMY  03/21/2013    Cholecystectomy    COLONOSCOPY  10/09/2019    Complete  Colonoscopy    CORONARY STENT PLACEMENT      GALLBLADDER SURGERY  06/18/2013    Gallbladder Surgery    OTHER SURGICAL HISTORY  11/22/2013    Gastric Surgery For Morbid Obesity Laparoscopic Longitudinal Gastrectomy    TONSILLECTOMY  06/18/2013    Tonsillectomy        Social History  She reports that she has never smoked. She has never been exposed to tobacco smoke. She has never used smokeless tobacco. She reports that she does not drink alcohol and does not use drugs.    Family History  Family History   Problem Relation Name Age of Onset    Colon cancer Mother Lisa Nguyễn     Hypertension Mother Lisa Nguyễn     Diabetes Mother Lisa Nguyễn     Cancer Mother Lisa Nguyễn     Stroke Father Navneet Nguyễn     Heart disease Father Navneet Nguyễn     Diabetes type II Maternal Grandmother Melissa Nunez     Diabetes type II Mother's Sister Raquel Nopgreysonn     Heart attack Mother's Brother Juan Nunez     Hypertension Mother's Brother Juan Nunez         Allergies  Azithromycin, Erythromycin, and Nsaids (non-steroidal anti-inflammatory drug)    Review of Systems  12 point ROS done and negative except for the above.     Physical Exam  General: NAD, well groomed, well nourished  Eyes: Non-icteric sclera, EOMI  Ears, Nose, Mouth, and Throat: External ears and nose appear to be without deformity or rash. No lesions or masses noted. Hearing is grossly intact.   Neck: Trachea midline  Respiratory: Nonlabored breathing   Cardiovascular: No peripheral edema   Skin: No rashes or open lesions/ulcers identified on skin.      Last Recorded Vitals  There were no vitals taken for this visit.    Relevant Results  Current Outpatient Medications   Medication Instructions    ascorbic acid (VITAMIN C) 1,000 mg, Daily    aspirin 81 mg EC tablet 1 tablet, Daily    atorvastatin (LIPITOR) 40 mg, oral, Daily    calcium citrate (Calcitrate) 200 mg (950 mg) tablet Take 2 tablets (400 mg) by mouth 2 times a day.    carvedilol (COREG) 12.5 mg, oral, 2 times  daily    cholecalciferol (VITAMIN D-3) 50 mcg, Daily    coenzyme Q-10 200 mg, Daily    cyanocobalamin, vitamin B-12, 2,500 mcg tablet, sublingual SL tablet 1 tablet, 2 times weekly    DULoxetine (CYMBALTA) 60 mg, oral, Daily, Do not crush or chew.    gabapentin (Neurontin) 300 mg capsule Day 1 Take 300mg at bedtime, Day 2 300mg BID, Day 3 300mg TID, Day 4-6 300mg AM 300mg afternoon 600mg at bedtime, Day 7-9 300mg Am, 600mg afternoon, 600mg at bedtime, Day 10 and after 600mg TID.    Janumet  mg tablet 1 tablet, oral, Daily    levothyroxine (Synthroid, Levoxyl) 137 mcg tablet TAKE 1 TABLET DAILY    lisinopril 2.5 mg, oral, Daily    magnesium oxide 500 mg tablet 1 tablet, Daily    metFORMIN (GLUCOPHAGE) 500 mg, oral, 2 times daily (morning and late afternoon)    multivitamin-iron-minerals-folic acid (Multivitamin 50 Plus) tablet 1 tablet, Daily    spironolactone (ALDACTONE) 12.5 mg, oral, 2 times daily    torsemide (DEMADEX) 20 mg, oral, Daily       No results found for this or any previous visit from the past 1000 days.     No image results found.     No diagnosis found.        Assessment/Plan   Nadeen Nguyễn is a 74 y.o. female presenting with chronic pain here for Lumbar interlaminar epidural steroid injection at the L4-5 level; she denies any recent antibiotic use or infections, she denies any blood thinner use, and she denies contrast or local anesthetic allergies.    ASA PS Classification: 3  Mallampati score: 3    Plan:  - We will proceed with the procedure as above. We discussed extensively the risks, benefits, and alternatives to the procedure. The patient's questions were addressed and answered in detail. The patient demonstrated understanding of the procedure, and is amenable to proceeding with it. The Risks of the procedure that were discussed with the patient include but are not limited to the following: A lack of efficacy, transient worsening of pain, bleeding, infection, nerve injury, nerve damage,  neuritis or sunburn sensation. Informed consent obtained as attached to EMR.  - Patient to continue physician directed home exercises as tolerated.  - Patient will follow-up with us in clinic.      Iqra Segovia MD  Chronic Pain Fellow  [facility]

## 2024-11-07 ENCOUNTER — APPOINTMENT (OUTPATIENT)
Dept: ORTHOPEDIC SURGERY | Facility: CLINIC | Age: 74
End: 2024-11-07
Payer: MEDICARE

## 2024-11-07 ENCOUNTER — HOSPITAL ENCOUNTER (OUTPATIENT)
Dept: CARDIOLOGY | Facility: CLINIC | Age: 74
Discharge: HOME | End: 2024-11-07
Payer: MEDICARE

## 2024-11-07 DIAGNOSIS — G56.03 BILATERAL CARPAL TUNNEL SYNDROME: ICD-10-CM

## 2024-11-07 DIAGNOSIS — I42.9 CARDIOMYOPATHY, UNSPECIFIED TYPE (MULTI): ICD-10-CM

## 2024-11-07 DIAGNOSIS — M19.049 CMC ARTHRITIS: ICD-10-CM

## 2024-11-07 DIAGNOSIS — M79.644 BILATERAL THUMB PAIN: Primary | ICD-10-CM

## 2024-11-07 DIAGNOSIS — M79.645 BILATERAL THUMB PAIN: Primary | ICD-10-CM

## 2024-11-07 PROCEDURE — 1123F ACP DISCUSS/DSCN MKR DOCD: CPT | Performed by: ORTHOPAEDIC SURGERY

## 2024-11-07 PROCEDURE — 3048F LDL-C <100 MG/DL: CPT | Performed by: ORTHOPAEDIC SURGERY

## 2024-11-07 PROCEDURE — 99213 OFFICE O/P EST LOW 20 MIN: CPT | Performed by: ORTHOPAEDIC SURGERY

## 2024-11-07 PROCEDURE — 3044F HG A1C LEVEL LT 7.0%: CPT | Performed by: ORTHOPAEDIC SURGERY

## 2024-11-07 PROCEDURE — 1160F RVW MEDS BY RX/DR IN RCRD: CPT | Performed by: ORTHOPAEDIC SURGERY

## 2024-11-07 PROCEDURE — 1159F MED LIST DOCD IN RCRD: CPT | Performed by: ORTHOPAEDIC SURGERY

## 2024-11-07 PROCEDURE — 1036F TOBACCO NON-USER: CPT | Performed by: ORTHOPAEDIC SURGERY

## 2024-11-07 PROCEDURE — 4010F ACE/ARB THERAPY RXD/TAKEN: CPT | Performed by: ORTHOPAEDIC SURGERY

## 2024-11-07 ASSESSMENT — ENCOUNTER SYMPTOMS
OCCASIONAL FEELINGS OF UNSTEADINESS: 0
LOSS OF SENSATION IN FEET: 0
DEPRESSION: 0

## 2024-11-07 ASSESSMENT — PAIN SCALES - GENERAL
PAINLEVEL_OUTOF10: 10 - WORST POSSIBLE PAIN
PAINLEVEL_OUTOF10: 2

## 2024-11-07 NOTE — ADDENDUM NOTE
Encounter addended by: Rebecca Faye RN on: 11/7/2024 10:29 AM   Actions taken: Contacts section saved, Flowsheet accepted

## 2024-11-08 ENCOUNTER — HOSPITAL ENCOUNTER (OUTPATIENT)
Dept: CARDIOLOGY | Facility: CLINIC | Age: 74
Discharge: HOME | End: 2024-11-08
Payer: MEDICARE

## 2024-11-08 DIAGNOSIS — I42.9 CARDIOMYOPATHY, UNSPECIFIED TYPE (MULTI): ICD-10-CM

## 2024-11-08 DIAGNOSIS — E03.9 HYPOTHYROIDISM, UNSPECIFIED TYPE: ICD-10-CM

## 2024-11-08 NOTE — PROGRESS NOTES
History of Present Illness:  Chief Complaint   Patient presents with    Right Hand - Pain    Left Hand - Pain     Patient seen previously for thumb CMC arthritis as well as carpal tunnel syndrome.  She was referred for EMG, which she completed in July 2024.  She does report some intermittent numbness and tingling into her fingers.  This is mostly controlled with use of bracing.  No weakness.      Past Medical History:   Diagnosis Date    Angina pectoris 08/15/2023    Atherosclerotic heart disease of native coronary artery with other forms of angina pectoris 08/15/2023    Cardiac defibrillator in place 08/15/2023    Cardiomyopathy 08/15/2023    Coronary artery disease 08/15/2023    Encounter for screening mammogram for malignant neoplasm of breast 04/11/2016    Visit for screening mammogram    Heart failure 08/15/2023    Hyperlipidemia 08/15/2023    Hypertension 08/15/2023    Hypothyroidism 08/15/2023    Mixed hyperlipidemia 08/15/2023    Obstructive sleep apnea 08/15/2023    Personal history of other diseases of the nervous system and sense organs     History of sleep apnea    Spinal stenosis, lumbar region without neurogenic claudication     Lumbar canal stenosis    Spondylosis without myelopathy or radiculopathy, cervical region 10/29/2015    Spondylosis of cervical region without myelopathy or radiculopathy    Type 2 diabetes mellitus 08/15/2023    Ventricular fibrillation (Multi) 08/15/2023    Ventricular tachycardia (Multi) 08/15/2023       Medication Documentation Review Audit       Reviewed by Thelma Chan LPN (Licensed Nurse) on 11/07/24 at 1312      Medication Order Taking? Sig Documenting Provider Last Dose Status   ascorbic acid (Vitamin C) 1,000 mg tablet [id number] No Take 1 tablet (1,000 mg) by mouth once daily. Historical Provider, MD Taking Active   aspirin 81 mg EC tablet [id number] No Take 1 tablet (81 mg) by mouth once daily. Historical Provider, MD Past Week Active   atorvastatin (Lipitor)  40 mg tablet [id number]  Take 1 tablet (40 mg) by mouth once daily. Jennifer Osorio DO  Active   calcium citrate (Calcitrate) 200 mg (950 mg) tablet [id number] No Take 2 tablets (400 mg) by mouth 2 times a day. Historical Provider, MD Taking Active   carvedilol (Coreg) 12.5 mg tablet [id number]  Take 1 tablet (12.5 mg) by mouth 2 times a day. Jennifer Osorio DO  Active   cholecalciferol (Vitamin D-3) 50 MCG (2000 UT) tablet [id number] No Take 1 tablet (50 mcg) by mouth once daily. Historical Provider, MD Taking Active   coenzyme Q-10 100 mg capsule [id number] No Take 2 capsules (200 mg) by mouth once daily. Historical Provider, MD Taking Active   cyanocobalamin, vitamin B-12, 2,500 mcg tablet, sublingual SL tablet [id number] No Place 1 tablet (2,500 mcg) under the tongue 2 times a week. Monday and Friday Historical Provider, MD Taking Active   DULoxetine (Cymbalta) 60 mg DR capsule [id number] No Take 1 capsule (60 mg) by mouth once daily. Do not crush or chew. Jennifer Osorio DO Past Week Active   gabapentin (Neurontin) 300 mg capsule [id number]  Day 1 Take 300mg at bedtime, Day 2 300mg BID, Day 3 300mg TID, Day 4-6 300mg AM 300mg afternoon 600mg at bedtime, Day 7-9 300mg Am, 600mg afternoon, 600mg at bedtime, Day 10 and after 600mg TID. Jose Dominguez MD  Active   Janumet  mg tablet [id number]  TAKE 1 TABLET ONCE DAILY Jennifer Osorio DO  Active   levothyroxine (Synthroid, Levoxyl) 137 mcg tablet [id number] No TAKE 1 TABLET DAILY Jennifer Osorio DO Taking Active   lisinopril 2.5 mg tablet [id number] No TAKE 1 TABLET ONCE DAILY Jennifer Osorio DO Taking Active   magnesium oxide 500 mg tablet [id number] No Take 1 tablet (500 mg) by mouth once daily. Historical Provider, MD Taking Active   metFORMIN (Glucophage) 500 mg tablet [id number]  TAKE 1 TABLET TWICE DAILY  WITH MEALS Jennifer Osorio DO  Active   multivitamin-iron-minerals-folic acid (Multivitamin 50 Plus) tablet [id number] No Take 1 tablet by mouth once daily. Historical  MD Hugh Taking Active   spironolactone (Aldactone) 25 mg tablet [id number]  Take 0.5 tablets (12.5 mg) by mouth 2 times a day. Jennifer Osorio,   Active   torsemide (Demadex) 20 mg tablet [id number]  Take 1 tablet (20 mg) by mouth once daily. Mesfin Johnson MD  Active                    Allergies   Allergen Reactions    Azithromycin Other and Unknown    Erythromycin Unknown    Nsaids (Non-Steroidal Anti-Inflammatory Drug) Unknown     Pt states her doctor told her that these medications cause her heart to work to fast.        Social History     Socioeconomic History    Marital status: Single     Spouse name: Not on file    Number of children: Not on file    Years of education: Not on file    Highest education level: Not on file   Occupational History    Not on file   Tobacco Use    Smoking status: Never     Passive exposure: Never    Smokeless tobacco: Never   Vaping Use    Vaping status: Never Used   Substance and Sexual Activity    Alcohol use: Never    Drug use: Never    Sexual activity: Never   Other Topics Concern    Not on file   Social History Narrative    Not on file     Social Drivers of Health     Financial Resource Strain: Low Risk  (12/15/2023)    Overall Financial Resource Strain (CARDIA)     Difficulty of Paying Living Expenses: Not hard at all   Food Insecurity: Not on file   Transportation Needs: No Transportation Needs (12/15/2023)    PRAPARE - Transportation     Lack of Transportation (Medical): No     Lack of Transportation (Non-Medical): No   Physical Activity: Not on file   Stress: Not on file   Social Connections: Not on file   Intimate Partner Violence: Not on file   Housing Stability: Low Risk  (12/15/2023)    Housing Stability Vital Sign     Unable to Pay for Housing in the Last Year: No     Number of Places Lived in the Last Year: 1     Unstable Housing in the Last Year: No       Past Surgical History:   Procedure Laterality Date    ABLATION OF DYSRHYTHMIC FOCUS      CARDIAC  CATHETERIZATION      CARDIAC ELECTROPHYSIOLOGY PROCEDURE N/A 12/15/2023    Procedure: ICD UPGRADE BIV;  Surgeon: Anselmo Lilly MD;  Location: [place] [address] [facility];  Service: Electrophysiology;  Laterality: N/A;  [id number] upgrade to BIV/ICD from dual chamber ICD [facility]    CARDIAC PACEMAKER PLACEMENT  04/11/2016    Pacemaker Placement    CARDIAC PACEMAKER PLACEMENT  06/14/2013    Pacemaker Placement    CHOLECYSTECTOMY  03/21/2013    Cholecystectomy    COLONOSCOPY  10/09/2019    Complete Colonoscopy    CORONARY STENT PLACEMENT      GALLBLADDER SURGERY  06/18/2013    Gallbladder Surgery    OTHER SURGICAL HISTORY  11/22/2013    Gastric Surgery For Morbid Obesity Laparoscopic Longitudinal Gastrectomy    TONSILLECTOMY  06/18/2013    Tonsillectomy          Review of Systems   GENERAL: Negative for malaise, significant weight loss, fever  MUSCULOSKELETAL: see HPI  NEURO:  see HPI     Physical Examination:  Constitutional: Appears well-developed and well-nourished.  Head: Normocephalic and atraumatic.  Eyes: EOMI grossly  Cardiovascular: Intact distal pulses.   Respiratory: Effort normal. No respiratory distress.  Neurologic: Alert and oriented to person, place, and time.  Skin: Skin is warm and dry.  Hematologic / Lymphatic: No lymphedema, lymphangitis.  Psychiatric: normal mood and affect. Behavior is normal.   Musculoskeletal:  bilateral wrist/hands:  No obvious swelling or masses  No thenar atrophy  No atrophy noted of hypothenar eminence   Positive Durkan's.  No sensitivity about ulnar nerve at level of elbows.  Sensation grossly intact to all digits  5/5 thumb Abduction/Finger Abduction  Tenderness about thumb cmc joints with positive CMC grind.  No tenderness about first dorsal compartment/thumb A1 pulley region  Radial pulse palpable  Good capillary refill     Bilateral upper extremity EMG from July 30, 2024 available for review demonstrates changes consistent with mild bilateral carpal tunnel  syndrome     Assessment:  Patient with bilateral thumb basilar joint arthritis and mild bilateral carpal tunnel syndrome.  Currently with symptoms under control with bracing.     Plan:  We once again reviewed diagnoses as well as risks and benefits of continued nonoperative versus operative treatment options.  Given relatively mild symptoms at this time she would like to continue with bracing as needed.  She was educated on the fact that we do not want symptoms to continue to progress in order to avoid potential permanent nerve dysfunction.  She will call the office for further follow-up if any worsening of symptoms.

## 2024-11-11 RX ORDER — LEVOTHYROXINE SODIUM 137 UG/1
137 TABLET ORAL DAILY
Qty: 90 TABLET | Refills: 1 | Status: SHIPPED | OUTPATIENT
Start: 2024-11-11

## 2024-11-12 ENCOUNTER — HOSPITAL ENCOUNTER (OUTPATIENT)
Dept: RADIOLOGY | Facility: HOSPITAL | Age: 74
Discharge: HOME | End: 2024-11-12
Payer: MEDICARE

## 2024-11-12 DIAGNOSIS — Z78.0 POST-MENOPAUSAL: ICD-10-CM

## 2024-11-12 PROCEDURE — 77080 DXA BONE DENSITY AXIAL: CPT | Performed by: RADIOLOGY

## 2024-11-12 PROCEDURE — 77080 DXA BONE DENSITY AXIAL: CPT

## 2024-11-22 ENCOUNTER — HOSPITAL ENCOUNTER (OUTPATIENT)
Dept: CARDIOLOGY | Facility: CLINIC | Age: 74
Discharge: HOME | End: 2024-11-22
Payer: MEDICARE

## 2024-11-22 DIAGNOSIS — I42.9 CARDIOMYOPATHY, UNSPECIFIED TYPE (MULTI): ICD-10-CM

## 2024-11-27 ENCOUNTER — HOSPITAL ENCOUNTER (OUTPATIENT)
Dept: CARDIOLOGY | Facility: CLINIC | Age: 74
Discharge: HOME | End: 2024-11-27
Payer: MEDICARE

## 2024-11-27 ENCOUNTER — APPOINTMENT (OUTPATIENT)
Dept: CARDIOLOGY | Facility: CLINIC | Age: 74
End: 2024-11-27
Payer: MEDICARE

## 2024-11-27 DIAGNOSIS — I42.9 CARDIOMYOPATHY, UNSPECIFIED TYPE (MULTI): ICD-10-CM

## 2024-12-23 ENCOUNTER — HOSPITAL ENCOUNTER (OUTPATIENT)
Dept: CARDIOLOGY | Facility: CLINIC | Age: 74
Discharge: HOME | End: 2024-12-23
Payer: MEDICARE

## 2024-12-23 DIAGNOSIS — I42.9 CARDIOMYOPATHY, UNSPECIFIED TYPE (MULTI): ICD-10-CM

## 2024-12-26 ENCOUNTER — HOSPITAL ENCOUNTER (OUTPATIENT)
Dept: CARDIOLOGY | Facility: CLINIC | Age: 74
Discharge: HOME | End: 2024-12-26
Payer: MEDICARE

## 2024-12-26 DIAGNOSIS — I42.9 CARDIOMYOPATHY, UNSPECIFIED TYPE (MULTI): ICD-10-CM

## 2025-01-02 DIAGNOSIS — I10 PRIMARY HYPERTENSION: ICD-10-CM

## 2025-01-02 DIAGNOSIS — I10 HYPERTENSION, UNSPECIFIED TYPE: ICD-10-CM

## 2025-01-02 RX ORDER — SPIRONOLACTONE 25 MG/1
TABLET ORAL
Qty: 90 TABLET | Refills: 1 | Status: SHIPPED | OUTPATIENT
Start: 2025-01-02

## 2025-01-02 RX ORDER — LISINOPRIL 2.5 MG/1
2.5 TABLET ORAL DAILY
Qty: 90 TABLET | Refills: 1 | Status: SHIPPED | OUTPATIENT
Start: 2025-01-02

## 2025-01-07 ENCOUNTER — APPOINTMENT (OUTPATIENT)
Dept: PAIN MEDICINE | Facility: CLINIC | Age: 75
End: 2025-01-07
Payer: MEDICARE

## 2025-01-07 VITALS — DIASTOLIC BLOOD PRESSURE: 72 MMHG | SYSTOLIC BLOOD PRESSURE: 113 MMHG | RESPIRATION RATE: 18 BRPM | HEART RATE: 98 BPM

## 2025-01-07 DIAGNOSIS — M48.062 SPINAL STENOSIS OF LUMBAR REGION WITH NEUROGENIC CLAUDICATION: Primary | ICD-10-CM

## 2025-01-07 DIAGNOSIS — M70.61 GREATER TROCHANTERIC BURSITIS OF BOTH HIPS: ICD-10-CM

## 2025-01-07 DIAGNOSIS — M70.62 GREATER TROCHANTERIC BURSITIS OF BOTH HIPS: ICD-10-CM

## 2025-01-07 PROCEDURE — 3078F DIAST BP <80 MM HG: CPT | Performed by: PHYSICAL MEDICINE & REHABILITATION

## 2025-01-07 PROCEDURE — 4010F ACE/ARB THERAPY RXD/TAKEN: CPT | Performed by: PHYSICAL MEDICINE & REHABILITATION

## 2025-01-07 PROCEDURE — 3074F SYST BP LT 130 MM HG: CPT | Performed by: PHYSICAL MEDICINE & REHABILITATION

## 2025-01-07 PROCEDURE — 99214 OFFICE O/P EST MOD 30 MIN: CPT | Performed by: PHYSICAL MEDICINE & REHABILITATION

## 2025-01-07 PROCEDURE — 1123F ACP DISCUSS/DSCN MKR DOCD: CPT | Performed by: PHYSICAL MEDICINE & REHABILITATION

## 2025-01-07 PROCEDURE — 1125F AMNT PAIN NOTED PAIN PRSNT: CPT | Performed by: PHYSICAL MEDICINE & REHABILITATION

## 2025-01-07 RX ORDER — GABAPENTIN 600 MG/1
600 TABLET ORAL 3 TIMES DAILY
Qty: 270 TABLET | Refills: 3 | Status: SHIPPED | OUTPATIENT
Start: 2025-01-07 | End: 2026-01-07

## 2025-01-07 ASSESSMENT — PAIN SCALES - GENERAL: PAINLEVEL_OUTOF10: 5

## 2025-01-07 NOTE — PROGRESS NOTES
Subjective   Patient ID: Nadeen Nguyễn is a 74 y.o. female who presents for Follow-up, Hip Pain, and Hand Pain.  HPI    74 year old female with lower back pain with lower extremity claudication symptoms presenting for follow up after initial L4-5 interlaminar epidural steroid injection.  Patient reports essentially completely relief of her claudication symptoms.  Still taking gabapentin which helps and needs a refill of that.  Has developed some bilateral lateral hip pain since the injection.  Otherwise happy with the results of the injection.                Monitoring and compliance:    ORT:    PDUQ:    Office Agreement:      Review of Systems     Current Outpatient Medications   Medication Instructions    ascorbic acid (VITAMIN C) 1,000 mg, Daily    aspirin 81 mg EC tablet 1 tablet, Daily    atorvastatin (LIPITOR) 40 mg, oral, Daily    calcium citrate (Calcitrate) 200 mg (950 mg) tablet Take 2 tablets (400 mg) by mouth 2 times a day.    carvedilol (COREG) 12.5 mg, oral, 2 times daily    cholecalciferol (VITAMIN D-3) 50 mcg, Daily    coenzyme Q-10 200 mg, Daily    cyanocobalamin, vitamin B-12, 2,500 mcg tablet, sublingual SL tablet 1 tablet, 2 times weekly    DULoxetine (CYMBALTA) 60 mg, oral, Daily, Do not crush or chew.    gabapentin (NEURONTIN) 600 mg, oral, 3 times daily    Janumet  mg tablet 1 tablet, oral, Daily    levothyroxine (SYNTHROID, LEVOXYL) 137 mcg, oral, Daily    lisinopril 2.5 mg, oral, Daily    magnesium oxide 500 mg tablet 1 tablet, Daily    metFORMIN (GLUCOPHAGE) 500 mg, oral, 2 times daily (morning and late afternoon)    multivitamin-iron-minerals-folic acid (Multivitamin 50 Plus) tablet 1 tablet, Daily    spironolactone (Aldactone) 25 mg tablet TAKE 1/2 TABLET TWO TIMES ADAY    torsemide (DEMADEX) 20 mg, oral, Daily        Past Medical History:   Diagnosis Date    Angina pectoris 08/15/2023    Atherosclerotic heart disease of native coronary artery with other forms of angina pectoris  08/15/2023    Cardiac defibrillator in place 08/15/2023    Cardiomyopathy 08/15/2023    Coronary artery disease 08/15/2023    Encounter for screening mammogram for malignant neoplasm of breast 04/11/2016    Visit for screening mammogram    Heart failure 08/15/2023    Hyperlipidemia 08/15/2023    Hypertension 08/15/2023    Hypothyroidism 08/15/2023    Mixed hyperlipidemia 08/15/2023    Obstructive sleep apnea 08/15/2023    Personal history of other diseases of the nervous system and sense organs     History of sleep apnea    Spinal stenosis, lumbar region without neurogenic claudication     Lumbar canal stenosis    Spondylosis without myelopathy or radiculopathy, cervical region 10/29/2015    Spondylosis of cervical region without myelopathy or radiculopathy    Type 2 diabetes mellitus 08/15/2023    Ventricular fibrillation (Multi) 08/15/2023    Ventricular tachycardia (Multi) 08/15/2023        Past Surgical History:   Procedure Laterality Date    ABLATION OF DYSRHYTHMIC FOCUS      CARDIAC CATHETERIZATION      CARDIAC ELECTROPHYSIOLOGY PROCEDURE N/A 12/15/2023    Procedure: ICD UPGRADE BIV;  Surgeon: Anselmo Lilly MD;  Location: [place] [address] [facility];  Service: Electrophysiology;  Laterality: N/A;  [id number] upgrade to BIV/ICD from dual chamber ICD [facility]    CARDIAC PACEMAKER PLACEMENT  04/11/2016    Pacemaker Placement    CARDIAC PACEMAKER PLACEMENT  06/14/2013    Pacemaker Placement    CHOLECYSTECTOMY  03/21/2013    Cholecystectomy    COLONOSCOPY  10/09/2019    Complete Colonoscopy    CORONARY STENT PLACEMENT      GALLBLADDER SURGERY  06/18/2013    Gallbladder Surgery    OTHER SURGICAL HISTORY  11/22/2013    Gastric Surgery For Morbid Obesity Laparoscopic Longitudinal Gastrectomy    TONSILLECTOMY  06/18/2013    Tonsillectomy        Family History   Problem Relation Name Age of Onset    Colon cancer Mother Lisa Nguyễn     Hypertension Mother Lisa Nguyễn     Diabetes Mother Lisa Nguyễn     Cancer  Mother Lisa Nguyễn     Stroke Father Navneet Nguyễn     Heart disease Father Navneet Nguyễn     Diabetes type II Maternal Grandmother Melissa Nunez     Diabetes type II Mother's Sister Raquel Torres     Heart attack Mother's Brother Juan Nunez     Hypertension Mother's Brother Juan Nunez         Allergies   Allergen Reactions    Azithromycin Other and Unknown    Erythromycin Unknown    Nsaids (Non-Steroidal Anti-Inflammatory Drug) Unknown     Pt states her doctor told her that these medications cause her heart to work to fast.         No results found for this or any previous visit from the past 1000 days.      Objective     Vitals:    01/07/25 1400   BP: 113/72   Pulse: 98   Resp: 18      Pain Score:   5        Physical Exam    GENERAL EXAM  Vital Signs: Vital signs to include heart rate, respiration rate, blood pressure, and temperature were reviewed.  General Appearance:  Awake, alert, healthy appearing, well developed, No acute distress.  Head: Normocephalic without evidence of head injury.  Neck: The appearance of the neck was normal without swelling with a midline trachea.  Eyes: The eyelids and eyebrows exhibited no abnormalities.  Pupils were not pin-point.  Sclera was without icterus.  Lungs: Respiration rhythm and depth was normal.  Respiratory movements were normal without labored breathing.  Cardiovascular: No peripheral edema was present.    Neurological: Patient was oriented to time, place, and person.  Speech was normal.  Balance, gait, and stance were unremarkable.    Psychiatric: Appearance was normal with appropriate dress.  Mood was euthymic and affect was normal.  Skin: Affected regions were without ecchymosis or skin lesions.        Physical exam as above except:  Tenderness to palpation over left greater than right greater trochanter area          Assessment/Plan   Diagnoses and all orders for this visit:  Spinal stenosis of lumbar region with neurogenic claudication  -     gabapentin (Neurontin)  600 mg tablet; Take 1 tablet (600 mg) by mouth 3 times a day.  Greater trochanteric bursitis of both hips  -     Referral to Physical Therapy; Future    74-year-old female with lumbar stenosis with neurogenic claudication that responded very well with essentially complete relief of her claudication symptoms from initial L4-5 epidural steroid injection 2 months ago.   Discussed that we can safely do these injections every 3 to 4 months should she continue to receive this amount of relief.  In terms of her hip pain she has developed some intermittent bursitis of both hips and we discussed physical therapy as really the best initial treatment of this.  Plan for today:  - Refer patient to physical therapy for the greater trochanteric pain syndrome.  - Will refill her gabapentin through [facility] and we will switch to 600 mg 3 times per day as she is titrated all the way up to 600 mg 3 times daily.  - May call us to schedule a repeat L4-5 interlaminar epidural steroid injection as needed.         This note was generated with the aid of dictation software, there may be typos despite my attempts at proofreading.

## 2025-01-09 ENCOUNTER — HOSPITAL ENCOUNTER (OUTPATIENT)
Dept: CARDIOLOGY | Facility: CLINIC | Age: 75
Discharge: HOME | End: 2025-01-09
Payer: MEDICARE

## 2025-01-09 DIAGNOSIS — I42.0 DILATED CARDIOMYOPATHY (MULTI): ICD-10-CM

## 2025-01-09 DIAGNOSIS — Z95.810 PRESENCE OF AUTOMATIC (IMPLANTABLE) CARDIAC DEFIBRILLATOR: ICD-10-CM

## 2025-01-10 ENCOUNTER — APPOINTMENT (OUTPATIENT)
Dept: CARDIOLOGY | Facility: CLINIC | Age: 75
End: 2025-01-10
Payer: MEDICARE

## 2025-01-10 ENCOUNTER — HOSPITAL ENCOUNTER (OUTPATIENT)
Dept: CARDIOLOGY | Facility: CLINIC | Age: 75
Discharge: HOME | End: 2025-01-10
Payer: MEDICARE

## 2025-01-10 VITALS
BODY MASS INDEX: 43.8 KG/M2 | RESPIRATION RATE: 18 BRPM | HEIGHT: 61 IN | OXYGEN SATURATION: 97 % | WEIGHT: 232 LBS | SYSTOLIC BLOOD PRESSURE: 112 MMHG | DIASTOLIC BLOOD PRESSURE: 62 MMHG | TEMPERATURE: 98.8 F | HEART RATE: 86 BPM

## 2025-01-10 DIAGNOSIS — Z95.810 PRESENCE OF AUTOMATIC (IMPLANTABLE) CARDIAC DEFIBRILLATOR: ICD-10-CM

## 2025-01-10 DIAGNOSIS — I25.708 CORONARY ARTERY DISEASE OF BYPASS GRAFT OF NATIVE HEART WITH STABLE ANGINA PECTORIS: ICD-10-CM

## 2025-01-10 DIAGNOSIS — I47.10 SVT (SUPRAVENTRICULAR TACHYCARDIA) (CMS-HCC): ICD-10-CM

## 2025-01-10 DIAGNOSIS — I10 PRIMARY HYPERTENSION: ICD-10-CM

## 2025-01-10 DIAGNOSIS — Z95.810 CARDIAC DEFIBRILLATOR IN PLACE: Primary | ICD-10-CM

## 2025-01-10 DIAGNOSIS — E78.2 MIXED HYPERLIPIDEMIA: ICD-10-CM

## 2025-01-10 DIAGNOSIS — I42.0 DILATED CARDIOMYOPATHY (MULTI): ICD-10-CM

## 2025-01-10 DIAGNOSIS — E11.40 TYPE 2 DIABETES MELLITUS WITH DIABETIC NEUROPATHY, UNSPECIFIED WHETHER LONG TERM INSULIN USE (MULTI): ICD-10-CM

## 2025-01-10 DIAGNOSIS — E66.01 MORBID OBESITY (MULTI): ICD-10-CM

## 2025-01-10 DIAGNOSIS — I25.118 ATHEROSCLEROTIC HEART DISEASE OF NATIVE CORONARY ARTERY WITH OTHER FORMS OF ANGINA PECTORIS: ICD-10-CM

## 2025-01-10 PROCEDURE — 1159F MED LIST DOCD IN RCRD: CPT | Performed by: INTERNAL MEDICINE

## 2025-01-10 PROCEDURE — 1126F AMNT PAIN NOTED NONE PRSNT: CPT | Performed by: INTERNAL MEDICINE

## 2025-01-10 PROCEDURE — 1123F ACP DISCUSS/DSCN MKR DOCD: CPT | Performed by: INTERNAL MEDICINE

## 2025-01-10 PROCEDURE — 99214 OFFICE O/P EST MOD 30 MIN: CPT | Performed by: INTERNAL MEDICINE

## 2025-01-10 PROCEDURE — 3008F BODY MASS INDEX DOCD: CPT | Performed by: INTERNAL MEDICINE

## 2025-01-10 PROCEDURE — 1036F TOBACCO NON-USER: CPT | Performed by: INTERNAL MEDICINE

## 2025-01-10 PROCEDURE — 4010F ACE/ARB THERAPY RXD/TAKEN: CPT | Performed by: INTERNAL MEDICINE

## 2025-01-10 PROCEDURE — G2211 COMPLEX E/M VISIT ADD ON: HCPCS | Performed by: INTERNAL MEDICINE

## 2025-01-10 PROCEDURE — 3078F DIAST BP <80 MM HG: CPT | Performed by: INTERNAL MEDICINE

## 2025-01-10 PROCEDURE — 3074F SYST BP LT 130 MM HG: CPT | Performed by: INTERNAL MEDICINE

## 2025-01-10 PROCEDURE — 1160F RVW MEDS BY RX/DR IN RCRD: CPT | Performed by: INTERNAL MEDICINE

## 2025-01-10 RX ORDER — BISOPROLOL FUMARATE 10 MG/1
10 TABLET, FILM COATED ORAL DAILY
Qty: 90 TABLET | Refills: 3 | Status: SHIPPED | OUTPATIENT
Start: 2025-01-10 | End: 2026-01-10

## 2025-01-10 ASSESSMENT — ENCOUNTER SYMPTOMS
OCCASIONAL FEELINGS OF UNSTEADINESS: 1
LOSS OF SENSATION IN FEET: 0
DEPRESSION: 0

## 2025-01-10 ASSESSMENT — LIFESTYLE VARIABLES
SKIP TO QUESTIONS 9-10: 1
HOW MANY STANDARD DRINKS CONTAINING ALCOHOL DO YOU HAVE ON A TYPICAL DAY: PATIENT DOES NOT DRINK
AUDIT-C TOTAL SCORE: 0
HOW OFTEN DO YOU HAVE A DRINK CONTAINING ALCOHOL: NEVER
HOW OFTEN DO YOU HAVE SIX OR MORE DRINKS ON ONE OCCASION: NEVER

## 2025-01-10 ASSESSMENT — PAIN SCALES - GENERAL: PAINLEVEL_OUTOF10: 0-NO PAIN

## 2025-01-10 NOTE — PROGRESS NOTES
Subjective   Chief Complaint   Patient presents with    Follow-up     Nadeen Nguyễn presents to the office today for an 8 month follow up visit.         72-year-old female patient multiple conditions.  Patient with class III.  Patient history of CAD CABG in the past.  History of bariatric surgery in past.  History of Obesity currently on multiple medication.  Patient also has history of ICD placed in the past.  History of cardiomyopathy.  Patient also history of chronic diastolic CHF.  Patient is CBC done about 3 months ago was unremarkable.  Lipid profile done about 4 months ago total cholesterol 123 with LDL was 47 and triglyceride was elevated 151.  Hemoglobin A1c 6% 4 months ago.  Comprehensive metabolic panel done 4 months ago was unremarkable.  CBC also was unremarkable except mild elevated white count.    Past Medical History:   Diagnosis Date    Angina pectoris 08/15/2023    Atherosclerotic heart disease of native coronary artery with other forms of angina pectoris 08/15/2023    Cardiac defibrillator in place 08/15/2023    Cardiomyopathy 08/15/2023    Coronary artery disease 08/15/2023    Encounter for screening mammogram for malignant neoplasm of breast 04/11/2016    Visit for screening mammogram    Heart failure 08/15/2023    Hyperlipidemia 08/15/2023    Hypertension 08/15/2023    Hypothyroidism 08/15/2023    Low back pain     Mixed hyperlipidemia 08/15/2023    Neck pain     Neuropathy in diabetes (Multi)     Obstructive sleep apnea 08/15/2023    Personal history of other diseases of the nervous system and sense organs     History of sleep apnea    Spinal stenosis, lumbar region without neurogenic claudication     Lumbar canal stenosis    Spondylosis without myelopathy or radiculopathy, cervical region 10/29/2015    Spondylosis of cervical region without myelopathy or radiculopathy    Type 2 diabetes mellitus 08/15/2023    Ventricular fibrillation (Multi) 08/15/2023    Ventricular tachycardia (Multi)  08/15/2023     Past Surgical History:   Procedure Laterality Date    ABLATION OF DYSRHYTHMIC FOCUS      CARDIAC CATHETERIZATION      CARDIAC ELECTROPHYSIOLOGY PROCEDURE N/A 12/15/2023    Procedure: ICD UPGRADE BIV;  Surgeon: Anselmo Lilly MD;  Location: [place] [address] [facility];  Service: Electrophysiology;  Laterality: N/A;  [id number] upgrade to BIV/ICD from dual chamber ICD [facility]    CARDIAC PACEMAKER PLACEMENT  04/11/2016    Pacemaker Placement    CARDIAC PACEMAKER PLACEMENT  06/14/2013    Pacemaker Placement    CHOLECYSTECTOMY  03/21/2013    Cholecystectomy    COLONOSCOPY  10/09/2019    Complete Colonoscopy    CORONARY STENT PLACEMENT      EPIDURAL BLOCK INJECTION      GALLBLADDER SURGERY  06/18/2013    Gallbladder Surgery    OTHER SURGICAL HISTORY  11/22/2013    Gastric Surgery For Morbid Obesity Laparoscopic Longitudinal Gastrectomy    TONSILLECTOMY  06/18/2013    Tonsillectomy     Family medical history includes stroke in father.  Current Outpatient Medications   Medication Sig Dispense Refill    ascorbic acid (Vitamin C) 1,000 mg tablet Take 1 tablet (1,000 mg) by mouth once daily.      aspirin 81 mg EC tablet Take 1 tablet (81 mg) by mouth once daily.      atorvastatin (Lipitor) 40 mg tablet Take 1 tablet (40 mg) by mouth once daily. 90 tablet 1    calcium citrate (Calcitrate) 200 mg (950 mg) tablet Take 2 tablets (400 mg) by mouth 2 times a day.      carvedilol (Coreg) 12.5 mg tablet Take 1 tablet (12.5 mg) by mouth 2 times a day. 60 tablet 11    cholecalciferol (Vitamin D-3) 50 MCG (2000 UT) tablet Take 1 tablet (50 mcg) by mouth once daily.      coenzyme Q-10 100 mg capsule Take 2 capsules (200 mg) by mouth once daily.      cyanocobalamin, vitamin B-12, 2,500 mcg tablet, sublingual SL tablet Place 1 tablet (2,500 mcg) under the tongue 2 times a week. Monday and Friday      DULoxetine (Cymbalta) 60 mg DR capsule Take 1 capsule (60 mg) by mouth once daily. Do not crush or chew. 90 capsule  1    gabapentin (Neurontin) 600 mg tablet Take 1 tablet (600 mg) by mouth 3 times a day. 270 tablet 3    Janumet  mg tablet TAKE 1 TABLET ONCE DAILY 90 tablet 1    levothyroxine (Synthroid, Levoxyl) 137 mcg tablet Take 1 tablet (137 mcg) by mouth once daily. 90 tablet 1    lisinopril 2.5 mg tablet TAKE 1 TABLET ONCE DAILY 90 tablet 1    magnesium oxide 500 mg tablet Take 1 tablet (500 mg) by mouth once daily.      metFORMIN (Glucophage) 500 mg tablet TAKE 1 TABLET TWICE DAILY  WITH MEALS 180 tablet 1    multivitamin-iron-minerals-folic acid (Multivitamin 50 Plus) tablet Take 1 tablet by mouth once daily.      spironolactone (Aldactone) 25 mg tablet TAKE 1/2 TABLET TWO TIMES ADAY 90 tablet 1    torsemide (Demadex) 20 mg tablet Take 1 tablet (20 mg) by mouth once daily. 90 tablet 3     No current facility-administered medications for this visit.      reports that she has never smoked. She has never been exposed to tobacco smoke. She has never used smokeless tobacco. She reports that she does not drink alcohol and does not use drugs.  Allergies:  Azithromycin, Erythromycin, and Nsaids (non-steroidal anti-inflammatory drug)    ROS: See HPI  CONSTITUTIONAL: Chills- none. Fever- none. Weight change appropriate for age.  HEENT: Headache- Negative.  Change in vision- none.  Ear pain- none. Nasal congestion- none. Post-nasal drip-none.  Sore throat-none.  CARDIOLOGY: Chest pain- none.  Leg edema-trace.  Murmurs-soft systolic.  Palpitation- none.  RESPIRATORY: Denies any shortness of breath.  GI: Abdominal pain- none.  Change in bowel habits- none.  Constipation- none.  Diarrhea- none.  Nausea- none.  Vomiting- none.  MUSCULOSKELETAL: Joint pain- none.  Muscle aches- none.  DERMATOLOGY: Rash- none.  NEUROLOGY: Dizziness- none.   Headache- none.  PSYCHIATRY: Denies any depression or anxiety     Vitals:    01/10/25 1644   BP: 112/62   Pulse: 86   Resp: 18   Temp: 37.1 °C (98.8 °F)   SpO2: 97%   Weight: 105 kg (232 lb)  "  Height: 1.549 m (5' 1\")   PainSc: 0-No pain      BMI:Body mass index is 43.84 kg/m².   General Cardiology:  General Appearance: Alert, oriented and in no acute distress.  HEENT: extra ocular movements intact (EOMI), pupils equal,  round, reactive to light and accommodation (PERRLA).  Carotid Upstroke: no bruit, normal.  Jugular Venous Distention (JVD): flat.  Chest: normal.  Lungs: Clear to auscultation,   Heart Sounds: no S3 or S4, normal S1, S2, regular rate.  Murmur, Click, Gallop: soft systolic murmur.  Abdomen: no hepatomegaly, no masses felt, soft.  Extremities: no leg edema.  Peripheral pulses: 2 plus bilateral.  NEUROLOGY Cranial nerves II-XII grossly intact.     Patient Active Problem List   Diagnosis    Angina pectoris    Arthritis of knee    Cardiac defibrillator in place    Cardiomyopathy    Chronic right shoulder pain    Atherosclerotic heart disease of native coronary artery with other forms of angina pectoris    Coronary artery disease    Depression    Eosinophilia    Hypertension    Hearing loss    Heart failure    Hordeolum externum of left eye    Hyperlipidemia    Mixed hyperlipidemia    Hypothyroidism    Insufficient sleep syndrome    Morbid obesity (Multi)    Obstructive sleep apnea    Sciatica of right side associated with disorder of lumbar spine    Sleep-related hypoventilation due to medical condition    Type 2 diabetes mellitus    Falls    Leukocytosis    Sensorineural hearing loss (SNHL) of both ears    History of heart failure    Malfunction of cardiac pacemaker    Swelling of lower extremity    Impacted cerumen    Spinal stenosis of lumbar region with neurogenic claudication    Spondylosis of cervical spine without myelopathy    Type 2 diabetes mellitus with diabetic neuropathy, unspecified whether long term insulin use (Multi)    Stage 3 chronic kidney disease, unspecified whether stage 3a or 3b CKD (Multi)    Lumbar spondylosis    Arthritis of carpometacarpal (CMC) joint of both " thumbs    Greater trochanteric bursitis of both hips       Problem List Items Addressed This Visit       Cardiac defibrillator in place - Primary    Cardiomyopathy    Atherosclerotic heart disease of native coronary artery with other forms of angina pectoris    Coronary artery disease    Hypertension    Hyperlipidemia    Morbid obesity (Multi)    Type 2 diabetes mellitus with diabetic neuropathy, unspecified whether long term insulin use (Multi)      74-year-old female patient with a history of obesity, hypertension, hyperlipidemia and CAD.  History of ICD placed in the past.  Multiple comorbid condition.  1.  Hypertension: Continue current spironolactone with torsemide.  Also continue Coreg.  2.  CAD: Continue current Coreg 12.5 mg p.o. twice daily.  Also continue aspirin 81 mg once a day.  Patient also on lisinopril 2.5 mg tablet daily.  4.  Diabetes: Continue current Janumet as well as metformin.  5.  Hyperlipidemia: Continue current Lipitor 40 mg tablet p.o. daily.  6.  SVT: Patient with a tachycardia palpitation currently not properly controlled with the Coreg doses.  Will discontinue Coreg and start patient bisoprolol 10 mg tablet p.o. be daily.    Advised patient to avoid lunch meats, canned soups, pizzas, bread rolls, and sandwiches. Advised patient to limit salt intake 1,500 mg daily. Advised patient to exercise 30 mins/3 times a week including treadmill or aerobic type, Goal to achieve 65% target HR.    Diet and exercise reviewed with patient..advice to walk about 10,000 steps or about 2 hours during day time. Cut back on salt, sugar and flour.    Pt. care time is spent includes review of diagnostic tests, labs, radiographs, EKGs, old echoes, cardiac work-up and coordination of care. Assessment, impression and plans are reflected in the note above as well as the orders.    Mesfin Johnson MD

## 2025-01-15 DIAGNOSIS — M48.062 SPINAL STENOSIS OF LUMBAR REGION WITH NEUROGENIC CLAUDICATION: ICD-10-CM

## 2025-01-15 NOTE — TELEPHONE ENCOUNTER
Patient requesting Gabapentin rx to be sent to [facility], mistakenly had us [facility] to [facility] at clinic visit.

## 2025-01-16 DIAGNOSIS — E78.5 HYPERLIPIDEMIA, UNSPECIFIED HYPERLIPIDEMIA TYPE: ICD-10-CM

## 2025-01-16 RX ORDER — GABAPENTIN 600 MG/1
600 TABLET ORAL 3 TIMES DAILY
Qty: 270 TABLET | Refills: 3 | Status: SHIPPED | OUTPATIENT
Start: 2025-01-16 | End: 2026-01-16

## 2025-01-16 RX ORDER — ATORVASTATIN CALCIUM 40 MG/1
40 TABLET, FILM COATED ORAL DAILY
Qty: 90 TABLET | Refills: 1 | Status: SHIPPED | OUTPATIENT
Start: 2025-01-16

## 2025-01-20 ENCOUNTER — HOSPITAL ENCOUNTER (OUTPATIENT)
Dept: CARDIOLOGY | Facility: CLINIC | Age: 75
Discharge: HOME | End: 2025-01-20
Payer: MEDICARE

## 2025-01-20 DIAGNOSIS — I42.9 CARDIOMYOPATHY, UNSPECIFIED TYPE (MULTI): ICD-10-CM

## 2025-01-20 PROCEDURE — 93295 DEV INTERROG REMOTE 1/2/MLT: CPT | Performed by: INTERNAL MEDICINE

## 2025-01-20 PROCEDURE — 93296 REM INTERROG EVL PM/IDS: CPT

## 2025-02-06 DIAGNOSIS — F32.A DEPRESSION, UNSPECIFIED DEPRESSION TYPE: ICD-10-CM

## 2025-02-06 RX ORDER — DULOXETIN HYDROCHLORIDE 60 MG/1
60 CAPSULE, DELAYED RELEASE ORAL DAILY
Qty: 90 CAPSULE | Refills: 1 | Status: SHIPPED | OUTPATIENT
Start: 2025-02-06

## 2025-04-20 DIAGNOSIS — E11.9 TYPE 2 DIABETES MELLITUS WITHOUT COMPLICATION, WITHOUT LONG-TERM CURRENT USE OF INSULIN: ICD-10-CM

## 2025-04-21 RX ORDER — METFORMIN HYDROCHLORIDE 500 MG/1
500 TABLET ORAL
Qty: 180 TABLET | Refills: 1 | Status: SHIPPED | OUTPATIENT
Start: 2025-04-21

## 2025-04-23 ENCOUNTER — APPOINTMENT (OUTPATIENT)
Dept: CARDIOLOGY | Facility: CLINIC | Age: 75
End: 2025-04-23
Payer: MEDICARE

## 2025-04-24 ENCOUNTER — TELEPHONE (OUTPATIENT)
Dept: PRIMARY CARE | Facility: CLINIC | Age: 75
End: 2025-04-24
Payer: MEDICARE

## 2025-04-24 DIAGNOSIS — R42 DIZZINESS: Primary | ICD-10-CM

## 2025-04-24 RX ORDER — MECLIZINE HYDROCHLORIDE 25 MG/1
25 TABLET ORAL 3 TIMES DAILY PRN
Qty: 30 TABLET | Refills: 0 | Status: SHIPPED | OUTPATIENT
Start: 2025-04-24 | End: 2025-05-04

## 2025-04-28 ENCOUNTER — HOSPITAL ENCOUNTER (OUTPATIENT)
Dept: CARDIOLOGY | Facility: CLINIC | Age: 75
Discharge: HOME | End: 2025-04-28
Payer: MEDICARE

## 2025-04-28 DIAGNOSIS — Z95.810 PRESENCE OF AUTOMATIC (IMPLANTABLE) CARDIAC DEFIBRILLATOR: ICD-10-CM

## 2025-04-28 DIAGNOSIS — I42.0 DILATED CARDIOMYOPATHY (MULTI): ICD-10-CM

## 2025-04-28 PROCEDURE — 93295 DEV INTERROG REMOTE 1/2/MLT: CPT | Performed by: INTERNAL MEDICINE

## 2025-04-28 PROCEDURE — 93296 REM INTERROG EVL PM/IDS: CPT

## 2025-05-03 DIAGNOSIS — E11.9 TYPE 2 DIABETES MELLITUS WITHOUT COMPLICATION, WITHOUT LONG-TERM CURRENT USE OF INSULIN: ICD-10-CM

## 2025-05-05 ENCOUNTER — APPOINTMENT (OUTPATIENT)
Dept: PRIMARY CARE | Facility: CLINIC | Age: 75
End: 2025-05-05
Payer: MEDICARE

## 2025-05-05 VITALS
WEIGHT: 227.4 LBS | HEIGHT: 61 IN | DIASTOLIC BLOOD PRESSURE: 58 MMHG | HEART RATE: 120 BPM | OXYGEN SATURATION: 97 % | BODY MASS INDEX: 42.93 KG/M2 | SYSTOLIC BLOOD PRESSURE: 120 MMHG

## 2025-05-05 DIAGNOSIS — I10 PRIMARY HYPERTENSION: ICD-10-CM

## 2025-05-05 DIAGNOSIS — Z00.00 ROUTINE GENERAL MEDICAL EXAMINATION AT HEALTH CARE FACILITY: Primary | ICD-10-CM

## 2025-05-05 DIAGNOSIS — E78.2 MIXED HYPERLIPIDEMIA: ICD-10-CM

## 2025-05-05 DIAGNOSIS — I50.32 CHRONIC DIASTOLIC HEART FAILURE: ICD-10-CM

## 2025-05-05 DIAGNOSIS — E11.40 TYPE 2 DIABETES MELLITUS WITH DIABETIC NEUROPATHY, UNSPECIFIED WHETHER LONG TERM INSULIN USE (MULTI): ICD-10-CM

## 2025-05-05 DIAGNOSIS — E03.9 HYPOTHYROIDISM, UNSPECIFIED TYPE: ICD-10-CM

## 2025-05-05 DIAGNOSIS — R61 HYPERHIDROSIS: ICD-10-CM

## 2025-05-05 DIAGNOSIS — I11.0 HYPERTENSIVE HEART DISEASE WITH HEART FAILURE: ICD-10-CM

## 2025-05-05 DIAGNOSIS — Z12.31 SCREENING MAMMOGRAM FOR BREAST CANCER: ICD-10-CM

## 2025-05-05 DIAGNOSIS — N18.30 STAGE 3 CHRONIC KIDNEY DISEASE, UNSPECIFIED WHETHER STAGE 3A OR 3B CKD (MULTI): ICD-10-CM

## 2025-05-05 PROBLEM — H00.016 HORDEOLUM EXTERNUM OF LEFT EYE: Status: RESOLVED | Noted: 2023-08-15 | Resolved: 2025-05-05

## 2025-05-05 PROBLEM — H90.3 SENSORINEURAL HEARING LOSS (SNHL) OF BOTH EARS: Status: ACTIVE | Noted: 2023-08-15

## 2025-05-05 PROBLEM — H61.20 IMPACTED CERUMEN: Status: RESOLVED | Noted: 2024-05-23 | Resolved: 2025-05-05

## 2025-05-05 PROBLEM — I50.9 HEART FAILURE: Status: RESOLVED | Noted: 2023-08-15 | Resolved: 2025-05-05

## 2025-05-05 PROCEDURE — 1160F RVW MEDS BY RX/DR IN RCRD: CPT | Performed by: STUDENT IN AN ORGANIZED HEALTH CARE EDUCATION/TRAINING PROGRAM

## 2025-05-05 PROCEDURE — 4010F ACE/ARB THERAPY RXD/TAKEN: CPT | Performed by: STUDENT IN AN ORGANIZED HEALTH CARE EDUCATION/TRAINING PROGRAM

## 2025-05-05 PROCEDURE — 3074F SYST BP LT 130 MM HG: CPT | Performed by: STUDENT IN AN ORGANIZED HEALTH CARE EDUCATION/TRAINING PROGRAM

## 2025-05-05 PROCEDURE — 3078F DIAST BP <80 MM HG: CPT | Performed by: STUDENT IN AN ORGANIZED HEALTH CARE EDUCATION/TRAINING PROGRAM

## 2025-05-05 PROCEDURE — 99214 OFFICE O/P EST MOD 30 MIN: CPT | Performed by: STUDENT IN AN ORGANIZED HEALTH CARE EDUCATION/TRAINING PROGRAM

## 2025-05-05 PROCEDURE — 1159F MED LIST DOCD IN RCRD: CPT | Performed by: STUDENT IN AN ORGANIZED HEALTH CARE EDUCATION/TRAINING PROGRAM

## 2025-05-05 PROCEDURE — G0439 PPPS, SUBSEQ VISIT: HCPCS | Performed by: STUDENT IN AN ORGANIZED HEALTH CARE EDUCATION/TRAINING PROGRAM

## 2025-05-05 PROCEDURE — 99397 PER PM REEVAL EST PAT 65+ YR: CPT | Performed by: STUDENT IN AN ORGANIZED HEALTH CARE EDUCATION/TRAINING PROGRAM

## 2025-05-05 PROCEDURE — 3008F BODY MASS INDEX DOCD: CPT | Performed by: STUDENT IN AN ORGANIZED HEALTH CARE EDUCATION/TRAINING PROGRAM

## 2025-05-05 PROCEDURE — 1170F FXNL STATUS ASSESSED: CPT | Performed by: STUDENT IN AN ORGANIZED HEALTH CARE EDUCATION/TRAINING PROGRAM

## 2025-05-05 RX ORDER — GLYCOPYRROLATE 1 MG/1
1 TABLET ORAL 2 TIMES DAILY
Qty: 60 TABLET | Refills: 1 | Status: SHIPPED | OUTPATIENT
Start: 2025-05-05 | End: 2026-05-05

## 2025-05-05 RX ORDER — SITAGLIPTIN AND METFORMIN HYDROCHLORIDE 500; 50 MG/1; MG/1
1 TABLET, FILM COATED ORAL DAILY
Qty: 90 TABLET | Refills: 1 | Status: SHIPPED | OUTPATIENT
Start: 2025-05-05

## 2025-05-05 ASSESSMENT — ACTIVITIES OF DAILY LIVING (ADL)
TAKING_MEDICATION: INDEPENDENT
BATHING: INDEPENDENT
GROCERY_SHOPPING: INDEPENDENT
MANAGING_FINANCES: INDEPENDENT
DRESSING: INDEPENDENT
DOING_HOUSEWORK: INDEPENDENT

## 2025-05-05 ASSESSMENT — ENCOUNTER SYMPTOMS
DEPRESSION: 0
OCCASIONAL FEELINGS OF UNSTEADINESS: 1
LOSS OF SENSATION IN FEET: 1

## 2025-05-05 NOTE — PROGRESS NOTES
"Subjective   Reason for Visit: Nadeen Nguyễn is an 74 y.o. female here for a Medicare Wellness visit.     Past Medical, Surgical, and Family History reviewed and updated in chart.    Reviewed all medications by prescribing practitioner or clinical pharmacist (such as prescriptions, OTCs, herbal therapies and supplements) and documented in the medical record.    HPI     Concerns:    Hyperhydrosis having her hair dripping       Having intermittent dizziness which improves with epley's maneuver     Right sided glute pain   Doing stretches     Bursitis in bilateral hips    Pain Management    Carpal Tunnel   Worsening, increased numbness,  strength    Following with Dr. Vicente    Patient Care Team:  Jennifer Osorio DO as PCP - General (Family Medicine)  Jennifer Osorio DO as PCP - [employer]age PCP  Shailesh Valerio MD (Internal Medicine)  Jennifer Osorio DO     Review of Systems    Objective   Vitals:  /58   Pulse (!) 120   Ht 1.549 m (5' 1\")   Wt 103 kg (227 lb 6.4 oz)   LMP  (LMP Unknown)   SpO2 97%   BMI 42.97 kg/m²       Physical Exam  Vitals reviewed.   Constitutional:       General: She is not in acute distress.     Appearance: She is not ill-appearing.   HENT:      Right Ear: Tympanic membrane and ear canal normal.      Left Ear: Tympanic membrane and ear canal normal.      Mouth/Throat:      Mouth: Mucous membranes are moist.      Pharynx: Oropharynx is clear. No oropharyngeal exudate or posterior oropharyngeal erythema.   Eyes:      Extraocular Movements: Extraocular movements intact.      Conjunctiva/sclera: Conjunctivae normal.      Pupils: Pupils are equal, round, and reactive to light.   Neck:      Vascular: No carotid bruit.   Cardiovascular:      Rate and Rhythm: Normal rate and regular rhythm.      Heart sounds: No murmur heard.     No gallop.   Pulmonary:      Effort: Pulmonary effort is normal.      Breath sounds: Normal breath sounds. No wheezing, rhonchi or rales. "   Abdominal:      General: Abdomen is flat. Bowel sounds are normal.      Palpations: Abdomen is soft.      Tenderness: There is no abdominal tenderness.   Musculoskeletal:      Cervical back: Neck supple.      Left lower leg: No edema.   Skin:     General: Skin is warm and dry.      Findings: No rash.   Neurological:      General: No focal deficit present.      Mental Status: She is alert and oriented to person, place, and time.      Gait: Gait normal.   Psychiatric:         Mood and Affect: Mood normal.         Behavior: Behavior normal.         Assessment & Plan  Routine general medical examination at health care facility    Orders:    1 Year Follow Up In Primary Care - Wellness Exam; Future    Primary hypertension         Chronic diastolic heart failure         Hypertensive heart disease with heart failure    Orders:    Comprehensive Metabolic Panel; Future    Lipid Panel; Future    TSH with reflex to Free T4 if abnormal; Future    CBC and Auto Differential; Future    Albumin-Creatinine Ratio, Urine Random; Future    Stage 3 chronic kidney disease, unspecified whether stage 3a or 3b CKD (Multi)         Mixed hyperlipidemia    Orders:    Lipid Panel; Future    Type 2 diabetes mellitus with diabetic neuropathy, unspecified whether long term insulin use (Multi)    Orders:    Hemoglobin A1C; Future    Hypothyroidism, unspecified type    Orders:    TSH with reflex to Free T4 if abnormal; Future    Screening mammogram for breast cancer    Orders:    BI mammo bilateral screening tomosynthesis; Future    Hyperhidrosis    Orders:    glycopyrrolate (Robinul) 1 mg tablet; Take 1 tablet (1 mg) by mouth 2 times a day.     Nadeen Nunez is a 74-year-old female with hypertension, diabetes, CAD status post pacemaker placement, hyperlipidemia, hypothyroidism and obesity who presents today for 5 month follow up and mwv.     Hyperhydrosis  Trial of glycopyrrolate 1mg tablet twice daily     Lumbar Stenosis with claudication   S/p  injection steroid   working with Pain management   Continue with home exercises and physical therapy.  Continue with duloxetine and gabapentin.  CT myelogram     DM  A1C pending previous 6.0% 8/19/24  Janumet  mg daily   Metformin 500mg BID (added due to cost for pt)  Lisinopril 2.5mg daily   Atorvastatin 40mg daily   Statin: yes, atorvastatin 40mg daily   ACE/ARB: yes  Albumin/Cr ratio: pending     HTN  120/58mmHg   Pt advised to hold night time medications today   Hold medications for BP <100 mmHg systolic   Bisoprolol 10mg daily  Lisinopril 2.5mg   Spironolactone 25mg daily   Torsemide 20mg daily  Previous medications: Carvedilol 12.5 mg BID  Physical exam was stable. Discussed maintaining diets such as DASH to help maintain normal Blood pressure. Encouraged regular exercise for a total of 120 min weekly. We will fu labs in 1-3 days. For now there will be no change in medical management. All questions and concerns were addressed. Pt will follow up in 4-6 months or sooner if needed.      Carpal Tunnel   Worsening, increased numbness,  strength    Pt considering injections, referred back to orthopedic   Following with Dr. Vicente    Hyperhydrosis   Trial of glycopyrrolate   RTC in 2 months for medication management    Carpal Tunnel   S/p EMG mild carpal tunnel   Pending re-evaluation with Dr. Vicente     Left sciatic Pain   Refractory to stretching and exercises   Referral to pain management for injections given      CAD s/p pacemaker placement   Hx CABG in 2007, PCI coronary  artery 2008.   S/p CS lead revesion for high threshold, she started having HF sx after losing synchronization  , she had a  new LBP  lead placement  Working well      DSL  continue atorvastatin 40mg daily   Lipid panel ordered today     CKD  Stage IIIA GFR 58   stable  ACE and SGLT-2 inhibitor on board   Continue to monitor      Hypothyroidism   continue levothyroxine 137mcg daily      Obesity   see DM for plan   Pt is down 12lbs  this past month   Notes decrease in appetite since metformin usage   Cut back on portion control      NOEL and Obesity (AHI 54 on 2012) on CPAP-12 and morbid obesity, S/P Bariatric surgery 2014  Body mass index is 48>46  Using vitamins due to bariatric surgery  Diet and Physical exercise recommendation  She has a pulmonologist

## 2025-05-06 ENCOUNTER — DOCUMENTATION (OUTPATIENT)
Dept: PRIMARY CARE | Facility: CLINIC | Age: 75
End: 2025-05-06
Payer: MEDICARE

## 2025-05-07 DIAGNOSIS — E03.9 HYPOTHYROIDISM, UNSPECIFIED TYPE: ICD-10-CM

## 2025-05-07 RX ORDER — LEVOTHYROXINE SODIUM 137 UG/1
137 TABLET ORAL DAILY
Qty: 90 TABLET | Refills: 1 | Status: SHIPPED | OUTPATIENT
Start: 2025-05-07

## 2025-05-28 DIAGNOSIS — R61 HYPERHIDROSIS: ICD-10-CM

## 2025-05-28 RX ORDER — GLYCOPYRROLATE 1 MG/1
1 TABLET ORAL 2 TIMES DAILY
Qty: 180 TABLET | Refills: 1 | Status: SHIPPED | OUTPATIENT
Start: 2025-05-28

## 2025-06-24 DIAGNOSIS — I10 PRIMARY HYPERTENSION: ICD-10-CM

## 2025-06-24 DIAGNOSIS — I10 HYPERTENSION, UNSPECIFIED TYPE: ICD-10-CM

## 2025-06-24 RX ORDER — LISINOPRIL 2.5 MG/1
2.5 TABLET ORAL DAILY
Qty: 90 TABLET | Refills: 1 | Status: SHIPPED | OUTPATIENT
Start: 2025-06-24

## 2025-06-24 RX ORDER — SPIRONOLACTONE 25 MG/1
TABLET ORAL
Qty: 90 TABLET | Refills: 1 | Status: SHIPPED | OUTPATIENT
Start: 2025-06-24

## 2025-07-02 DIAGNOSIS — R17 ELEVATED BILIRUBIN: Primary | ICD-10-CM

## 2025-07-03 LAB
ALBUMIN SERPL-MCNC: 4.3 G/DL (ref 3.6–5.1)
ALBUMIN/CREAT UR: 3 MG/G CREAT
ALP SERPL-CCNC: 77 U/L (ref 37–153)
ALT SERPL-CCNC: 12 U/L (ref 6–29)
ANION GAP SERPL CALCULATED.4IONS-SCNC: 13 MMOL/L (CALC) (ref 7–17)
AST SERPL-CCNC: 22 U/L (ref 10–35)
BASOPHILS # BLD AUTO: 55 CELLS/UL (ref 0–200)
BASOPHILS NFR BLD AUTO: 0.6 %
BILIRUB DIRECT SERPL-MCNC: 0.3 MG/DL
BILIRUB SERPL-MCNC: 1.7 MG/DL (ref 0.2–1.2)
BUN SERPL-MCNC: 30 MG/DL (ref 7–25)
CALCIUM SERPL-MCNC: 9.4 MG/DL (ref 8.6–10.4)
CHLORIDE SERPL-SCNC: 101 MMOL/L (ref 98–110)
CHOLEST SERPL-MCNC: 141 MG/DL
CHOLEST/HDLC SERPL: 2.8 (CALC)
CO2 SERPL-SCNC: 24 MMOL/L (ref 20–32)
CREAT SERPL-MCNC: 1.14 MG/DL (ref 0.6–1)
CREAT UR-MCNC: 74 MG/DL (ref 20–275)
EGFRCR SERPLBLD CKD-EPI 2021: 51 ML/MIN/1.73M2
EOSINOPHIL # BLD AUTO: 524 CELLS/UL (ref 15–500)
EOSINOPHIL NFR BLD AUTO: 5.7 %
ERYTHROCYTE [DISTWIDTH] IN BLOOD BY AUTOMATED COUNT: 13.3 % (ref 11–15)
EST. AVERAGE GLUCOSE BLD GHB EST-MCNC: 134 MG/DL
EST. AVERAGE GLUCOSE BLD GHB EST-SCNC: 7.4 MMOL/L
GLUCOSE SERPL-MCNC: 130 MG/DL (ref 65–99)
HBA1C MFR BLD: 6.3 %
HCT VFR BLD AUTO: 44.6 % (ref 35–45)
HDLC SERPL-MCNC: 51 MG/DL
HGB BLD-MCNC: 14.7 G/DL (ref 11.7–15.5)
LDLC SERPL CALC-MCNC: 65 MG/DL (CALC)
LYMPHOCYTES # BLD AUTO: 2199 CELLS/UL (ref 850–3900)
LYMPHOCYTES NFR BLD AUTO: 23.9 %
MCH RBC QN AUTO: 29.8 PG (ref 27–33)
MCHC RBC AUTO-ENTMCNC: 33 G/DL (ref 32–36)
MCV RBC AUTO: 90.3 FL (ref 80–100)
MICROALBUMIN UR-MCNC: 0.2 MG/DL
MONOCYTES # BLD AUTO: 727 CELLS/UL (ref 200–950)
MONOCYTES NFR BLD AUTO: 7.9 %
NEUTROPHILS # BLD AUTO: 5695 CELLS/UL (ref 1500–7800)
NEUTROPHILS NFR BLD AUTO: 61.9 %
NONHDLC SERPL-MCNC: 90 MG/DL (CALC)
PLATELET # BLD AUTO: 233 THOUSAND/UL (ref 140–400)
PMV BLD REES-ECKER: 10.9 FL (ref 7.5–12.5)
POTASSIUM SERPL-SCNC: 4.4 MMOL/L (ref 3.5–5.3)
PROT SERPL-MCNC: 6.6 G/DL (ref 6.1–8.1)
RBC # BLD AUTO: 4.94 MILLION/UL (ref 3.8–5.1)
SODIUM SERPL-SCNC: 138 MMOL/L (ref 135–146)
TRIGL SERPL-MCNC: 171 MG/DL
TSH SERPL-ACNC: 1.35 MIU/L (ref 0.4–4.5)
WBC # BLD AUTO: 9.2 THOUSAND/UL (ref 3.8–10.8)

## 2025-07-07 ENCOUNTER — APPOINTMENT (OUTPATIENT)
Dept: PRIMARY CARE | Facility: CLINIC | Age: 75
End: 2025-07-07
Payer: MEDICARE

## 2025-07-07 VITALS
SYSTOLIC BLOOD PRESSURE: 104 MMHG | OXYGEN SATURATION: 97 % | HEART RATE: 61 BPM | WEIGHT: 222 LBS | BODY MASS INDEX: 41.91 KG/M2 | DIASTOLIC BLOOD PRESSURE: 50 MMHG | HEIGHT: 61 IN

## 2025-07-07 DIAGNOSIS — E11.40 TYPE 2 DIABETES MELLITUS WITH DIABETIC NEUROPATHY, UNSPECIFIED WHETHER LONG TERM INSULIN USE (MULTI): ICD-10-CM

## 2025-07-07 DIAGNOSIS — N18.30 STAGE 3 CHRONIC KIDNEY DISEASE, UNSPECIFIED WHETHER STAGE 3A OR 3B CKD (MULTI): ICD-10-CM

## 2025-07-07 DIAGNOSIS — I10 PRIMARY HYPERTENSION: Primary | ICD-10-CM

## 2025-07-07 DIAGNOSIS — R17 ELEVATED BILIRUBIN: ICD-10-CM

## 2025-07-07 PROCEDURE — 4010F ACE/ARB THERAPY RXD/TAKEN: CPT | Performed by: STUDENT IN AN ORGANIZED HEALTH CARE EDUCATION/TRAINING PROGRAM

## 2025-07-07 PROCEDURE — 3008F BODY MASS INDEX DOCD: CPT | Performed by: STUDENT IN AN ORGANIZED HEALTH CARE EDUCATION/TRAINING PROGRAM

## 2025-07-07 PROCEDURE — 1159F MED LIST DOCD IN RCRD: CPT | Performed by: STUDENT IN AN ORGANIZED HEALTH CARE EDUCATION/TRAINING PROGRAM

## 2025-07-07 PROCEDURE — 3078F DIAST BP <80 MM HG: CPT | Performed by: STUDENT IN AN ORGANIZED HEALTH CARE EDUCATION/TRAINING PROGRAM

## 2025-07-07 PROCEDURE — 1160F RVW MEDS BY RX/DR IN RCRD: CPT | Performed by: STUDENT IN AN ORGANIZED HEALTH CARE EDUCATION/TRAINING PROGRAM

## 2025-07-07 PROCEDURE — G2211 COMPLEX E/M VISIT ADD ON: HCPCS | Performed by: STUDENT IN AN ORGANIZED HEALTH CARE EDUCATION/TRAINING PROGRAM

## 2025-07-07 PROCEDURE — 99214 OFFICE O/P EST MOD 30 MIN: CPT | Performed by: STUDENT IN AN ORGANIZED HEALTH CARE EDUCATION/TRAINING PROGRAM

## 2025-07-07 PROCEDURE — 3074F SYST BP LT 130 MM HG: CPT | Performed by: STUDENT IN AN ORGANIZED HEALTH CARE EDUCATION/TRAINING PROGRAM

## 2025-07-07 NOTE — PROGRESS NOTES
"  Subjective   Patient ID:   Nadeen Nguyễn is a  74 y.o. female who presents for Follow-up (PT IS HERE FOR A 2 MO MED CHECK.).     HPI   Continues to have left hand pain       Bilirubin elevation   S/p cholecyectomy  since she was in her 20        Current Medications[1]    Visit Vitals  /50   Pulse 61   Ht (!) 1.549 m (5' 1\")   Wt 101 kg (222 lb)   LMP  (LMP Unknown)   SpO2 97%   BMI 41.95 kg/m²   OB Status Postmenopausal   Smoking Status Never   BSA 2.08 m²       Objective   Physical Exam  Vitals reviewed.   Constitutional:       Appearance: Normal appearance.     Cardiovascular:      Rate and Rhythm: Normal rate and regular rhythm.      Heart sounds: No murmur heard.  Pulmonary:      Effort: Pulmonary effort is normal. No respiratory distress.      Breath sounds: Normal breath sounds. No wheezing.     Musculoskeletal:      Cervical back: Neck supple.      Left lower leg: No edema.     Neurological:      Mental Status: She is alert.           Assessment/Plan   Diagnoses and all orders for this visit:  Elevated bilirubin  -     US right upper quadrant; Future  Primary hypertension  -     Comprehensive Metabolic Panel; Future  -     CBC and Auto Differential; Future  Stage 3 chronic kidney disease, unspecified whether stage 3a or 3b CKD (Multi)  -     Comprehensive Metabolic Panel; Future  -     CBC and Auto Differential; Future  Type 2 diabetes mellitus with diabetic neuropathy, unspecified whether long term insulin use (Multi)  -     Comprehensive Metabolic Panel; Future  -     CBC and Auto Differential; Future    Nadeen Nunez is a 74-year-old female with hypertension, diabetes, CAD status post pacemaker placement, hyperlipidemia, hypothyroidism and obesity who presents today for 5 month follow up and mwv.      Hyperhydrosis  Trial of glycopyrrolate 1mg tablet twice daily      Lumbar Stenosis with claudication   S/p injection steroid   working with Pain management   Continue with home exercises and physical " therapy.  Continue with duloxetine and gabapentin.  CT myelogram     DM  A1C 6.3% <--6.0% 8/19/24  Janumet  mg daily   Metformin 500mg BID (added due to cost for pt, pt noting some mild diarrhea)  Lisinopril 2.5mg daily   Atorvastatin 40mg daily   Statin: yes, atorvastatin 40mg daily   ACE/ARB: yes  Albumin/Cr ratio: pending    ,  Bilirubin elevation   S/p cholecyectomy  since she was in her 20s  - US RUQ        HTN  104//50 mmHg   Pt advised to hold night time medications today   Hold medications for BP <100 mmHg systolic   Bisoprolol 10mg daily  Lisinopril 2.5mg   Spironolactone 25mg daily   Torsemide 20mg daily  Previous medications: Carvedilol 12.5 mg BID  Physical exam was stable. Discussed maintaining diets such as DASH to help maintain normal Blood pressure. Encouraged regular exercise for a total of 120 min weekly. We will fu labs in 1-3 days. For now there will be no change in medical management. All questions and concerns were addressed. Pt will follow up in 4-6 months or sooner if needed.      Carpal Tunnel   Worsening, increased numbness,  strength    Pt considering injections, referred back to orthopedic   S/p EMG mild carpal tunnel   Pending re-evaluation with Dr. Vicente     Following with Dr. Vicente     Left sciatic Pain   Refractory to stretching and exercises   Referral to pain management for injections given      CAD s/p pacemaker placement   Hx CABG in 2007, PCI coronary  artery 2008.   S/p CS lead revesion for high threshold, she started having HF sx after losing synchronization  , she had a  new LBP  lead placement  Working well      DSL  continue atorvastatin 40mg daily   Lipid panel ordered today     CKD  Stage IIIA GFR 58   stable  ACE and SGLT-2 inhibitor on board   Continue to monitor      Hypothyroidism   continue levothyroxine 137mcg daily      Obesity   see DM for plan   Pt is down 12lbs this past month   Notes decrease in appetite since metformin usage   Cut back on portion  control      NOEL and Obesity (AHI 54 on 2012) on CPAP-12 and morbid obesity, S/P Bariatric surgery 2014  Body mass index is 48>46  Using vitamins due to bariatric surgery  Diet and Physical exercise recommendation  She has a pulmonologist       Jennifer Osorio DO 03/12/25 2:02 PM          [1]   Current Outpatient Medications:     ascorbic acid (Vitamin C) 1,000 mg tablet, Take 1 tablet (1,000 mg) by mouth once daily., Disp: , Rfl:     aspirin 81 mg EC tablet, Take 1 tablet (81 mg) by mouth once daily., Disp: , Rfl:     bisoprolol (Zebeta) 10 mg tablet, Take 1 tablet (10 mg) by mouth once daily., Disp: 90 tablet, Rfl: 3    calcium citrate (Calcitrate) 200 mg (950 mg) tablet, Take 2 tablets by mouth 2 times a day., Disp: , Rfl:     cholecalciferol (Vitamin D-3) 50 MCG (2000 UT) tablet, Take 1 tablet (50 mcg) by mouth once daily., Disp: , Rfl:     coenzyme Q-10 100 mg capsule, Take 2 capsules (200 mg) by mouth once daily., Disp: , Rfl:     cyanocobalamin, vitamin B-12, 2,500 mcg tablet, sublingual SL tablet, Place 1 tablet (2,500 mcg) under the tongue 2 times a week. Monday and Friday, Disp: , Rfl:     DULoxetine (Cymbalta) 60 mg DR capsule, TAKE 1 CAPSULE (60 MG) BY MOUTH ONCE DAILY. DO NOT CRUSH OR CHEW., Disp: 90 capsule, Rfl: 1    gabapentin (Neurontin) 600 mg tablet, Take 1 tablet (600 mg) by mouth 3 times a day., Disp: 270 tablet, Rfl: 3    Janumet  mg tablet, TAKE 1 TABLET ONCE DAILY, Disp: 90 tablet, Rfl: 1    levothyroxine (Synthroid, Levoxyl) 137 mcg tablet, TAKE 1 TABLET (137 MCG) BY MOUTH ONCE DAILY., Disp: 90 tablet, Rfl: 1    lisinopril 2.5 mg tablet, TAKE 1 TABLET ONCE DAILY, Disp: 90 tablet, Rfl: 1    magnesium oxide 500 mg tablet, Take 1 tablet (500 mg) by mouth once daily., Disp: , Rfl:     metFORMIN (Glucophage) 500 mg tablet, TAKE 1 TABLET TWICE DAILY  WITH MEALS, Disp: 180 tablet, Rfl: 1    multivitamin-iron-minerals-folic acid (Multivitamin 50 Plus) tablet, Take 1 tablet by mouth once daily.,  Disp: , Rfl:     spironolactone (Aldactone) 25 mg tablet, TAKE 1/2 TABLET TWO TIMES ADAY, Disp: 90 tablet, Rfl: 1    torsemide (Demadex) 20 mg tablet, Take 1 tablet (20 mg) by mouth once daily., Disp: 90 tablet, Rfl: 3    atorvastatin (Lipitor) 40 mg tablet, TAKE 1 TABLET BY MOUTH EVERY DAY, Disp: 90 tablet, Rfl: 1    glycopyrrolate (Robinul) 1 mg tablet, TAKE 1 TABLET BY MOUTH TWICE A DAY (Patient not taking: Reported on 7/8/2025), Disp: 180 tablet, Rfl: 1    semaglutide (Ozempic) 0.25 mg or 0.5 mg (2 mg/3 mL) pen injector, Inject 0.25 mg under the skin every 7 days for 60 days, THEN 0.5 mg every 7 days., Disp: 3 mL, Rfl: 11

## 2025-07-08 ENCOUNTER — HOSPITAL ENCOUNTER (OUTPATIENT)
Dept: RADIOLOGY | Facility: HOSPITAL | Age: 75
Discharge: HOME | End: 2025-07-08
Payer: MEDICARE

## 2025-07-08 ENCOUNTER — APPOINTMENT (OUTPATIENT)
Dept: CARDIOLOGY | Facility: CLINIC | Age: 75
End: 2025-07-08
Payer: MEDICARE

## 2025-07-08 VITALS
HEIGHT: 61 IN | HEART RATE: 67 BPM | OXYGEN SATURATION: 98 % | SYSTOLIC BLOOD PRESSURE: 110 MMHG | WEIGHT: 220 LBS | BODY MASS INDEX: 41.54 KG/M2 | DIASTOLIC BLOOD PRESSURE: 58 MMHG

## 2025-07-08 DIAGNOSIS — I25.118 ATHEROSCLEROTIC HEART DISEASE OF NATIVE CORONARY ARTERY WITH OTHER FORMS OF ANGINA PECTORIS: Primary | ICD-10-CM

## 2025-07-08 DIAGNOSIS — N18.30 STAGE 3 CHRONIC KIDNEY DISEASE, UNSPECIFIED WHETHER STAGE 3A OR 3B CKD (MULTI): ICD-10-CM

## 2025-07-08 DIAGNOSIS — I10 PRIMARY HYPERTENSION: ICD-10-CM

## 2025-07-08 DIAGNOSIS — E78.2 MIXED HYPERLIPIDEMIA: ICD-10-CM

## 2025-07-08 DIAGNOSIS — Z95.810 CARDIAC DEFIBRILLATOR IN PLACE: ICD-10-CM

## 2025-07-08 DIAGNOSIS — R73.09 ELEVATED HEMOGLOBIN A1C: ICD-10-CM

## 2025-07-08 DIAGNOSIS — R17 ELEVATED BILIRUBIN: ICD-10-CM

## 2025-07-08 DIAGNOSIS — E11.40 TYPE 2 DIABETES MELLITUS WITH DIABETIC NEUROPATHY, UNSPECIFIED WHETHER LONG TERM INSULIN USE (MULTI): ICD-10-CM

## 2025-07-08 PROCEDURE — 1036F TOBACCO NON-USER: CPT | Performed by: INTERNAL MEDICINE

## 2025-07-08 PROCEDURE — 3074F SYST BP LT 130 MM HG: CPT | Performed by: INTERNAL MEDICINE

## 2025-07-08 PROCEDURE — 3008F BODY MASS INDEX DOCD: CPT | Performed by: INTERNAL MEDICINE

## 2025-07-08 PROCEDURE — 1160F RVW MEDS BY RX/DR IN RCRD: CPT | Performed by: INTERNAL MEDICINE

## 2025-07-08 PROCEDURE — 99215 OFFICE O/P EST HI 40 MIN: CPT | Performed by: INTERNAL MEDICINE

## 2025-07-08 PROCEDURE — 4010F ACE/ARB THERAPY RXD/TAKEN: CPT | Performed by: INTERNAL MEDICINE

## 2025-07-08 PROCEDURE — G2211 COMPLEX E/M VISIT ADD ON: HCPCS | Performed by: INTERNAL MEDICINE

## 2025-07-08 PROCEDURE — 76705 ECHO EXAM OF ABDOMEN: CPT

## 2025-07-08 PROCEDURE — 1126F AMNT PAIN NOTED NONE PRSNT: CPT | Performed by: INTERNAL MEDICINE

## 2025-07-08 PROCEDURE — 76705 ECHO EXAM OF ABDOMEN: CPT | Performed by: RADIOLOGY

## 2025-07-08 PROCEDURE — 3078F DIAST BP <80 MM HG: CPT | Performed by: INTERNAL MEDICINE

## 2025-07-08 PROCEDURE — 1159F MED LIST DOCD IN RCRD: CPT | Performed by: INTERNAL MEDICINE

## 2025-07-08 RX ORDER — SEMAGLUTIDE 0.68 MG/ML
INJECTION, SOLUTION SUBCUTANEOUS
Qty: 3 ML | Refills: 11 | Status: SHIPPED | OUTPATIENT
Start: 2025-07-08 | End: 2026-09-06

## 2025-07-08 ASSESSMENT — ENCOUNTER SYMPTOMS
DEPRESSION: 0
OCCASIONAL FEELINGS OF UNSTEADINESS: 0
LOSS OF SENSATION IN FEET: 0

## 2025-07-08 ASSESSMENT — PAIN SCALES - GENERAL: PAINLEVEL_OUTOF10: 0-NO PAIN

## 2025-07-08 NOTE — PROGRESS NOTES
[facility] [place]        Subjective   Chief Complaint   Patient presents with    Follow-up     Nadeen Nguyễn presents to the office today for a 6 month follow up.         74-year-old patient with multiple comorbid condition for history of bariatric surgery in past, obesity class III.  History of CAD CABG in the past.  Patient stable cardiac wise.  No active chest pain tightness.  History of ICD placed in the past due to DVT.  CHF.  Patient had a recently lab done hemoglobin A1c was about 6.3% week ago.  Abdomen creatinine about week ago was unremarkable.  CBC also a week ago unremarkable.  TSH with reflex free T4 within normal range.  I reviewed all the lab independently also lipid profile done control was unremarkable except elevated triglyceride 171.  LDL was 65.  No active chest pain tightness.  Patient currently on aspirin, Lipitor, bisoprolol as well as Janumet for type 2 diabetes.  Patient also currently on lisinopril and metformin.    She has a past medical history of Angina pectoris (08/15/2023), Arthritis, Atherosclerotic heart disease of native coronary artery with other forms of angina pectoris (08/15/2023), Cardiac defibrillator in place (08/15/2023), Cardiomyopathy (08/15/2023), Coronary artery disease (08/15/2023), Difficulty walking, Encounter for screening mammogram for malignant neoplasm of breast (04/11/2016), Heart disease (2006), Heart failure (08/15/2023), HL (hearing loss), Hyperlipidemia (08/15/2023), Hypertension (08/15/2023), Hypothyroidism (08/15/2023), Low back pain, Mixed hyperlipidemia (08/15/2023), Neck pain, Neuropathy in diabetes (Multi), Numbness, Obstructive sleep apnea (08/15/2023), Personal history of other diseases of the nervous system and sense organs, Spinal stenosis, lumbar region without neurogenic claudication, Spondylosis without myelopathy or radiculopathy, cervical region (10/29/2015), Type 2 diabetes mellitus (08/15/2023),  Ventricular fibrillation (Multi) (08/15/2023), and Ventricular tachycardia (Multi) (08/15/2023).  She has a past surgical history that includes Cholecystectomy (03/21/2013); Tonsillectomy (06/18/2013); Cardiac pacemaker placement (04/11/2016); Colonoscopy (10/09/2019); Other surgical history (11/22/2013); Cardiac pacemaker placement (06/14/2013); Gallbladder surgery (06/18/2013); Ablation of dysrhythmic focus (2006); Cardiac catheterization; Coronary stent placement; Cardiac electrophysiology procedure (N/A, 12/15/2023); and Epidural block injection.   Family medical history includes stroke in father.  Current Outpatient Medications   Medication Sig Dispense Refill    ascorbic acid (Vitamin C) 1,000 mg tablet Take 1 tablet (1,000 mg) by mouth once daily.      aspirin 81 mg EC tablet Take 1 tablet (81 mg) by mouth once daily.      atorvastatin (Lipitor) 40 mg tablet TAKE 1 TABLET BY MOUTH EVERY DAY 90 tablet 1    bisoprolol (Zebeta) 10 mg tablet Take 1 tablet (10 mg) by mouth once daily. 90 tablet 3    calcium citrate (Calcitrate) 200 mg (950 mg) tablet Take 2 tablets by mouth 2 times a day.      cholecalciferol (Vitamin D-3) 50 MCG (2000 UT) tablet Take 1 tablet (50 mcg) by mouth once daily.      coenzyme Q-10 100 mg capsule Take 2 capsules (200 mg) by mouth once daily.      cyanocobalamin, vitamin B-12, 2,500 mcg tablet, sublingual SL tablet Place 1 tablet (2,500 mcg) under the tongue 2 times a week. Monday and Friday      DULoxetine (Cymbalta) 60 mg DR capsule TAKE 1 CAPSULE (60 MG) BY MOUTH ONCE DAILY. DO NOT CRUSH OR CHEW. 90 capsule 1    gabapentin (Neurontin) 600 mg tablet Take 1 tablet (600 mg) by mouth 3 times a day. 270 tablet 3    Janumet  mg tablet TAKE 1 TABLET ONCE DAILY 90 tablet 1    levothyroxine (Synthroid, Levoxyl) 137 mcg tablet TAKE 1 TABLET (137 MCG) BY MOUTH ONCE DAILY. 90 tablet 1    lisinopril 2.5 mg tablet TAKE 1 TABLET ONCE DAILY 90 tablet 1    magnesium oxide 500 mg tablet Take 1  "tablet (500 mg) by mouth once daily.      metFORMIN (Glucophage) 500 mg tablet TAKE 1 TABLET TWICE DAILY  WITH MEALS 180 tablet 1    multivitamin-iron-minerals-folic acid (Multivitamin 50 Plus) tablet Take 1 tablet by mouth once daily.      spironolactone (Aldactone) 25 mg tablet TAKE 1/2 TABLET TWO TIMES ADAY 90 tablet 1    torsemide (Demadex) 20 mg tablet Take 1 tablet (20 mg) by mouth once daily. 90 tablet 3    glycopyrrolate (Robinul) 1 mg tablet TAKE 1 TABLET BY MOUTH TWICE A DAY (Patient not taking: Reported on 7/8/2025) 180 tablet 1     No current facility-administered medications for this visit.      reports that she has never smoked. She has never been exposed to tobacco smoke. She has never used smokeless tobacco. She reports that she does not drink alcohol and does not use drugs.  Allergies:  Azithromycin, Erythromycin, and Nsaids (non-steroidal anti-inflammatory drug)    ROS: See HPI  CONSTITUTIONAL: Chills- none. Fever- none. Weight change appropriate for age.  HEENT: Headache- Negative.  Change in vision- none.  Ear pain- none. Nasal congestion- none. Post-nasal drip-none.  Sore throat-none.  CARDIOLOGY: Chest pain- none.  Leg edema-trace.  Murmurs-soft systolic.  Palpitation- none.  RESPIRATORY: Denies any shortness of breath.  GI: Abdominal pain- none.  Change in bowel habits- none.  Constipation- none.  Diarrhea- none.  Nausea- none.  Vomiting- none.  MUSCULOSKELETAL: Joint pain- none.  Muscle aches- none.  DERMATOLOGY: Rash- none.  NEUROLOGY: Dizziness- none.   Headache- none.  PSYCHIATRY: Denies any depression or anxiety     Vitals:    07/08/25 1023   BP: 110/58   Pulse: 67   SpO2: 98%   Weight: 99.8 kg (220 lb)   Height: (!) 1.549 m (5' 1\")   PainSc: 0-No pain      BMI:Body mass index is 41.57 kg/m².   General Cardiology:  General Appearance: Alert, oriented and in no acute distress.  HEENT: extra ocular movements intact (EOMI), pupils equal,  round, reactive to light and accommodation " "(PERRLA).  Carotid Upstroke: no bruit, normal.  Jugular Venous Distention (JVD): flat.  Chest: normal.  Lungs: Clear to auscultation,   Heart Sounds: no S3 or S4, normal S1, S2, regular rate.  Murmur, Click, Gallop: soft systolic murmur.  Abdomen: no hepatomegaly, no masses felt, soft.  Extremities: no leg edema.  Peripheral pulses: 2 plus bilateral.  NEUROLOGY Cranial nerves II-XII grossly intact.     Last Labs:  CMP:  Recent Labs     06/30/25  1420 08/19/24  1057 02/19/24  1158    138 144   K 4.4 4.3 4.5    100 102   CO2 24 24 25   ANIONGAP 13 18 22*   BUN 30* 24* 24*   CREATININE 1.14* 0.95 1.03   EGFR 51* 63 58*   GLUCOSE 130* 130* 140*     Recent Labs     06/30/25  1420 08/19/24  1057 09/19/23  1112   ALBUMIN 4.3 4.2 4.0   ALKPHOS 77 100 81   ALT 12 15 11   AST 22 20 18   BILITOT 1.7* 1.6* 1.7*     CBC:  Recent Labs     06/30/25  1420 09/26/24  1455 08/19/24  1057   WBC 9.2 11.0 11.7*   HGB 14.7 14.4 14.4   HCT 44.6 43.8 44.2    254 293   MCV 90.3 89 90     COAG:   Recent Labs     09/26/24  1455 12/11/23  1633   INR 1.0 1.1     HEME/ENDO:  Recent Labs     06/30/25  1420 08/19/24  1057 02/19/24  1130 09/19/23  1112 08/15/23  1012 04/06/22  0934   TSH 1.35  --   --  0.61  --  0.23*   HGBA1C 6.3* 6.0* 6.8*  --    < >  --     < > = values in this interval not displayed.      CARDIAC: No results for input(s): \"LDH\", \"CKMB\", \"TROPHS\", \"BNP\" in the last 78245 hours.    No lab exists for component: \"CK\", \"CKMBP\"  Recent Labs     06/30/25  1420 08/19/24  1057 09/19/23  1112 02/07/22  1125 01/06/21  1026   CHOL 141 123 142 129 142   LDLF  --   --  59 45 59   LDLCALC 65 47  --   --   --    HDL 51 45.4 49.6 45.4 45.4   TRIG 171* 151* 166* 192* 187*       Last Cardiology Tests:  Echo:  Echo Results:  No results found for this or any previous visit from the past 3650 days.     Cath:  Stress Test:  Stress Results:  No results found for this or any previous visit from the past 365 days.     Cardiac " Imaging:    Problem List Items Addressed This Visit       Cardiac defibrillator in place    Atherosclerotic heart disease of native coronary artery with other forms of angina pectoris - Primary    Hypertension    Hyperlipidemia    Type 2 diabetes mellitus with diabetic neuropathy, unspecified whether long term insulin use (Multi)    Stage 3 chronic kidney disease, unspecified whether stage 3a or 3b CKD (Multi)    Elevated hemoglobin A1c      74-year-old female patient with multiple risk factors as well as a comorbid condition.  History of CAD, CABG, stents placement past history of ICD as well as type 2 diabetes.  1.  Type 2 diabetes: Continue current Janumet as well as metformin.  Patient with other hemoglobin A1c probably benefit from GLP-1 discussed with the patient with benefits risk alternative.  2.  Elevated hemoglobin A1c: Goal to keep A1c less than 6%.  Discussed with the patient about GLP-1.  Understands cardiovascular risks benefits.  3.  Primary hypertension: Continue current lisinopril as well as bisoprolol.  4.  Mixed hyperlipidemia: Patient currently on atorvastatin 40 mg I will increase to 80 mg tablet p.o. daily.  5.  CAD/CABG/ICD: Patient lungs coronary disease continue current aspirin.  As a risk factor modification.  Also patient currently on torsemide and spironolactone.    Advised patient to avoid lunch meats, canned soups, pizzas, bread rolls, and sandwiches. Advised patient to limit salt intake 1,500 mg daily. Advised patient to exercise 30 mins/3 times a week including treadmill or aerobic type, Goal to achieve 65% target HR.    Diet and exercise reviewed with patient..advice to walk about 10,000 steps or about 2 hours during day time. Cut back on salt, sugar and flour.    Pt. care time is spent includes independent review of diagnostic tests, labs, radiographs, EKGs and coordination of care. Assessment, impression and plans are reflected in the note above as well as the orders.    Mesfin THAO  MD Alex  [facility] [place]  [facility]

## 2025-07-09 DIAGNOSIS — E78.5 HYPERLIPIDEMIA, UNSPECIFIED HYPERLIPIDEMIA TYPE: ICD-10-CM

## 2025-07-09 RX ORDER — ATORVASTATIN CALCIUM 40 MG/1
40 TABLET, FILM COATED ORAL DAILY
Qty: 90 TABLET | Refills: 1 | Status: SHIPPED | OUTPATIENT
Start: 2025-07-09

## 2025-07-17 ENCOUNTER — OFFICE VISIT (OUTPATIENT)
Dept: ORTHOPEDIC SURGERY | Facility: CLINIC | Age: 75
End: 2025-07-17
Payer: MEDICARE

## 2025-07-17 DIAGNOSIS — M19.049 CMC ARTHRITIS: Primary | ICD-10-CM

## 2025-07-17 PROCEDURE — 99202 OFFICE O/P NEW SF 15 MIN: CPT | Performed by: ORTHOPAEDIC SURGERY

## 2025-07-17 ASSESSMENT — PAIN SCALES - GENERAL: PAINLEVEL_OUTOF10: 2

## 2025-07-17 ASSESSMENT — PAIN - FUNCTIONAL ASSESSMENT: PAIN_FUNCTIONAL_ASSESSMENT: 0-10

## 2025-07-19 PROCEDURE — 2500000004 HC RX 250 GENERAL PHARMACY W/ HCPCS (ALT 636 FOR OP/ED): Performed by: ORTHOPAEDIC SURGERY

## 2025-07-19 PROCEDURE — 20600 DRAIN/INJ JOINT/BURSA W/O US: CPT | Mod: LT | Performed by: ORTHOPAEDIC SURGERY

## 2025-07-19 RX ORDER — TRIAMCINOLONE ACETONIDE 40 MG/ML
40 INJECTION, SUSPENSION INTRA-ARTICULAR; INTRAMUSCULAR
Status: COMPLETED | OUTPATIENT
Start: 2025-07-19 | End: 2025-07-19

## 2025-07-19 RX ORDER — LIDOCAINE HYDROCHLORIDE 10 MG/ML
1 INJECTION, SOLUTION INFILTRATION; PERINEURAL
Status: COMPLETED | OUTPATIENT
Start: 2025-07-19 | End: 2025-07-19

## 2025-07-19 RX ADMIN — BETAMETHASONE DIPROPIONATE 40 MG: 0.5 OINTMENT TOPICAL at 17:51

## 2025-07-19 RX ADMIN — LIDOCAINE HYDROCHLORIDE 1 ML: 10 INJECTION, SOLUTION INFILTRATION; PERINEURAL at 17:51

## 2025-07-19 NOTE — PROGRESS NOTES
History of Present Illness:  Chief Complaint   Patient presents with    Left Hand - Follow-up     bilateral thumb basilar joint arthritis and mild bilateral carpal tunnel syndrome    Right Hand - Follow-up     Patient returns for evaluation of left basilar thumb arthritis.  She does have some symptoms on the right side as well, but left side is currently much more symptomatic.  She has not had any numbness or tingling recently.  Pain in her thumb is worse with gripping and pinching and somewhat better with rest and bracing.      Past Medical History:   Diagnosis Date    Angina pectoris 08/15/2023    Arthritis     Atherosclerotic heart disease of native coronary artery with other forms of angina pectoris 08/15/2023    Cardiac defibrillator in place 08/15/2023    Cardiomyopathy 08/15/2023    Coronary artery disease 08/15/2023    Difficulty walking     Encounter for screening mammogram for malignant neoplasm of breast 04/11/2016    Visit for screening mammogram    Heart disease 2006    Heart failure 08/15/2023    HL (hearing loss)     Hyperlipidemia 08/15/2023    Hypertension 08/15/2023    Hypothyroidism 08/15/2023    Low back pain     Mixed hyperlipidemia 08/15/2023    Neck pain     Neuropathy in diabetes (Multi)     Numbness     Obstructive sleep apnea 08/15/2023    Personal history of other diseases of the nervous system and sense organs     History of sleep apnea    Spinal stenosis, lumbar region without neurogenic claudication     Lumbar canal stenosis    Spondylosis without myelopathy or radiculopathy, cervical region 10/29/2015    Spondylosis of cervical region without myelopathy or radiculopathy    Type 2 diabetes mellitus 08/15/2023    Ventricular fibrillation (Multi) 08/15/2023    Ventricular tachycardia (Multi) 08/15/2023       Medication Documentation Review Audit       Reviewed by Abigail Robles CMA (Medical Assistant) on 07/17/25 at 0921      Medication Order Taking? Sig Documenting Provider Last Dose  Status   ascorbic acid (Vitamin C) 1,000 mg tablet [id number] No Take 1 tablet (1,000 mg) by mouth once daily. Historical Provider, MD Taking Active   aspirin 81 mg EC tablet [id number]  Take 1 tablet (81 mg) by mouth once daily. Historical Provider, MD  Active   atorvastatin (Lipitor) 40 mg tablet [id number]  TAKE 1 TABLET BY MOUTH EVERY DAY Jennifer Osorio, DO  Active   bisoprolol (Zebeta) 10 mg tablet [id number]  Take 1 tablet (10 mg) by mouth once daily. Mesfin Johnson MD  Active   calcium citrate (Calcitrate) 200 mg (950 mg) tablet [id number] No Take 2 tablets by mouth 2 times a day. Historical Provider, MD Taking Active   cholecalciferol (Vitamin D-3) 50 MCG (2000 UT) tablet [id number] No Take 1 tablet (50 mcg) by mouth once daily. Historical Provider, MD Taking Active   coenzyme Q-10 100 mg capsule [id number] No Take 2 capsules (200 mg) by mouth once daily. Historical Provider, MD Taking Active   cyanocobalamin, vitamin B-12, 2,500 mcg tablet, sublingual SL tablet [id number] No Place 1 tablet (2,500 mcg) under the tongue 2 times a week. Monday and Friday Historical Provider, MD Taking Active   DULoxetine (Cymbalta) 60 mg DR capsule [id number]  TAKE 1 CAPSULE (60 MG) BY MOUTH ONCE DAILY. DO NOT CRUSH OR CHEW. Jennifer Osorio DO  Active   gabapentin (Neurontin) 600 mg tablet [id number]  Take 1 tablet (600 mg) by mouth 3 times a day. Jose Dominguez MD  Active   glycopyrrolate (Robinul) 1 mg tablet [id number]  TAKE 1 TABLET BY MOUTH TWICE A DAY   Patient not taking: Reported on 7/8/2025    Jennifer Osorio DO  Active   Janumet  mg tablet [id number]  TAKE 1 TABLET ONCE DAILY Jennifer Osorio DO  Active   levothyroxine (Synthroid, Levoxyl) 137 mcg tablet [id number]  TAKE 1 TABLET (137 MCG) BY MOUTH ONCE DAILY. Jennifer Osorio DO  Active   lisinopril 2.5 mg tablet [id number]  TAKE 1 TABLET ONCE DAILY Jennifer Osorio DO  Active   magnesium oxide 500 mg tablet [id number] No Take 1 tablet (500 mg) by mouth once daily. Historical  Provider, MD Taking Active   metFORMIN (Glucophage) 500 mg tablet [id number]  TAKE 1 TABLET TWICE DAILY  WITH MEALS Jennifer Osorio, DO  Active   multivitamin-iron-minerals-folic acid (Multivitamin 50 Plus) tablet [id number] No Take 1 tablet by mouth once daily. Historical Provider, MD Taking Active   semaglutide (Ozempic) 0.25 mg or 0.5 mg (2 mg/3 mL) pen injector [id number]  Inject 0.25 mg under the skin every 7 days for 60 days, THEN 0.5 mg every 7 days. Mesfin Johnson MD  Active   spironolactone (Aldactone) 25 mg tablet [id number]  TAKE 1/2 TABLET TWO TIMES ADAY Jennifer Osorio,   Active   torsemide (Demadex) 20 mg tablet [id number]  Take 1 tablet (20 mg) by mouth once daily. Mesfin Johnson MD  Active                    Allergies   Allergen Reactions    Azithromycin Other and Unknown    Erythromycin Unknown    Nsaids (Non-Steroidal Anti-Inflammatory Drug) Unknown     Pt states her doctor told her that these medications cause her heart to work to fast.        Social History     Socioeconomic History    Marital status: Single     Spouse name: Not on file    Number of children: Not on file    Years of education: Not on file    Highest education level: Not on file   Occupational History    Not on file   Tobacco Use    Smoking status: Never     Passive exposure: Never    Smokeless tobacco: Never   Vaping Use    Vaping status: Never Used   Substance and Sexual Activity    Alcohol use: Never    Drug use: Never    Sexual activity: Never   Other Topics Concern    Not on file   Social History Narrative    Not on file     Social Drivers of Health     Financial Resource Strain: Low Risk  (12/15/2023)    Overall Financial Resource Strain (CARDIA)     Difficulty of Paying Living Expenses: Not hard at all   Food Insecurity: Not on file   Transportation Needs: No Transportation Needs (12/15/2023)    PRAPARE - Transportation     Lack of Transportation (Medical): No     Lack of Transportation (Non-Medical): No   Physical Activity: Not  on file   Stress: Not on file   Social Connections: Not on file   Intimate Partner Violence: Not on file   Housing Stability: Low Risk  (12/15/2023)    Housing Stability Vital Sign     Unable to Pay for Housing in the Last Year: No     Number of Places Lived in the Last Year: 1     Unstable Housing in the Last Year: No       Past Surgical History:   Procedure Laterality Date    ABLATION OF DYSRHYTHMIC FOCUS  2006    CARDIAC CATHETERIZATION      CARDIAC ELECTROPHYSIOLOGY PROCEDURE N/A 12/15/2023    Procedure: ICD UPGRADE BIV;  Surgeon: Anselmo Lilly MD;  Location: [place] [address] Cardiac Cath Lab;  Service: Electrophysiology;  Laterality: N/A;  [id number] upgrade to BIV/ICD from dual chamber ICD [facility]    CARDIAC PACEMAKER PLACEMENT  04/11/2016    Pacemaker Placement    CARDIAC PACEMAKER PLACEMENT  06/14/2013    Pacemaker Placement    CHOLECYSTECTOMY  03/21/2013    Cholecystectomy    COLONOSCOPY  10/09/2019    Complete Colonoscopy    CORONARY STENT PLACEMENT      EPIDURAL BLOCK INJECTION      GALLBLADDER SURGERY  06/18/2013    Gallbladder Surgery    OTHER SURGICAL HISTORY  11/22/2013    Gastric Surgery For Morbid Obesity Laparoscopic Longitudinal Gastrectomy    TONSILLECTOMY  06/18/2013    Tonsillectomy          Review of Systems   GENERAL: Negative for malaise, significant weight loss, fever  MUSCULOSKELETAL: see HPI  NEURO:  see HPI     Physical Examination:  Constitutional: Appears well-developed and well-nourished.  Head: Normocephalic and atraumatic.  Eyes: EOMI grossly  Cardiovascular: Intact distal pulses.   Respiratory: Effort normal. No respiratory distress.  Neurologic: Alert and oriented to person, place, and time.  Skin: Skin is warm and dry.  Hematologic / Lymphatic: No lymphedema, lymphangitis.  Psychiatric: normal mood and affect. Behavior is normal.   Musculoskeletal:  bilateral wrist/hands:  No obvious swelling or masses  No thenar atrophy  No atrophy noted of hypothenar eminence   Negative  Durkan's.  No sensitivity about ulnar nerve at level of elbows.  Sensation grossly intact to all digits  5/5 thumb Abduction/Finger Abduction  Tenderness about thumb cmc joints with positive CMC grind.  No tenderness about first dorsal compartment/thumb A1 pulley region  Radial pulse palpable  Good capillary refill        Assessment:  Patient with bilateral thumb basilar joint arthritis and mild bilateral carpal tunnel syndrome.  No carpal tunnel symptoms at this time.  Left thumb most symptomatic.     Plan:  Continue to monitor carpal tunnel symptoms given resolution at this time.  With regards to her thumb CMC arthritis we discussed risks and benefits of various treatment options.  After thoroughly reviewing patient wished to proceed with corticosteroid injection for her left side.  Follow-up as needed.    Patient ID: Nadeen Nguyễn is a 74 y.o. female.    S Inj/Asp: L thumb CMC on 7/19/2025 5:51 PM  Indications: pain  Details: 25 G needle (dorsoradial) approach  Medications: 40 mg triamcinolone acetonide 40 mg/mL; 1 mL lidocaine 10 mg/mL (1 %)  Outcome: tolerated well, no immediate complications    Prepped with alcohol. Patient tolerated injection well. Band-aid applied to injection site.     Procedure, treatment alternatives, risks and benefits explained, specific risks discussed. Consent was given by the patient. Immediately prior to procedure a time out was called to verify the correct patient, procedure, equipment, support staff and site/side marked as required.

## 2025-07-23 ENCOUNTER — TELEPHONE (OUTPATIENT)
Dept: AUDIOLOGY | Facility: CLINIC | Age: 75
End: 2025-07-23
Payer: MEDICARE

## 2025-07-23 NOTE — TELEPHONE ENCOUNTER
Patient is struggling with hearing aids not working properly; retention tails fell off and one aid is not producing sound. Audiology assistant appointment was scheduled.

## 2025-07-24 ENCOUNTER — APPOINTMENT (OUTPATIENT)
Dept: AUDIOLOGY | Facility: CLINIC | Age: 75
End: 2025-07-24

## 2025-07-24 PROCEDURE — V5014 HEARING AID REPAIR/MODIFYING: HCPCS | Performed by: AUDIOLOGIST

## 2025-07-28 ENCOUNTER — HOSPITAL ENCOUNTER (OUTPATIENT)
Dept: CARDIOLOGY | Facility: CLINIC | Age: 75
Discharge: HOME | End: 2025-07-28
Payer: MEDICARE

## 2025-07-28 DIAGNOSIS — I42.9 CARDIOMYOPATHY, UNSPECIFIED TYPE (MULTI): ICD-10-CM

## 2025-07-28 PROCEDURE — 93295 DEV INTERROG REMOTE 1/2/MLT: CPT | Performed by: INTERNAL MEDICINE

## 2025-07-28 PROCEDURE — 93296 REM INTERROG EVL PM/IDS: CPT

## 2025-07-31 DIAGNOSIS — F32.A DEPRESSION, UNSPECIFIED DEPRESSION TYPE: ICD-10-CM

## 2025-07-31 RX ORDER — DULOXETIN HYDROCHLORIDE 60 MG/1
60 CAPSULE, DELAYED RELEASE ORAL DAILY
Qty: 90 CAPSULE | Refills: 1 | Status: SHIPPED | OUTPATIENT
Start: 2025-07-31

## 2025-08-11 ENCOUNTER — TELEPHONE (OUTPATIENT)
Dept: AUDIOLOGY | Facility: CLINIC | Age: 75
End: 2025-08-11
Payer: MEDICARE

## 2025-08-13 ENCOUNTER — HOSPITAL ENCOUNTER (OUTPATIENT)
Dept: CARDIOLOGY | Facility: CLINIC | Age: 75
Discharge: HOME | End: 2025-08-13
Payer: MEDICARE

## 2025-08-13 DIAGNOSIS — I42.9 CARDIOMYOPATHY, UNSPECIFIED TYPE (MULTI): ICD-10-CM

## 2025-08-26 ENCOUNTER — APPOINTMENT (OUTPATIENT)
Dept: AUDIOLOGY | Facility: CLINIC | Age: 75
End: 2025-08-26
Payer: MEDICARE

## 2025-09-17 ENCOUNTER — APPOINTMENT (OUTPATIENT)
Dept: RADIOLOGY | Facility: HOSPITAL | Age: 75
End: 2025-09-17
Payer: MEDICARE

## 2026-01-08 ENCOUNTER — APPOINTMENT (OUTPATIENT)
Dept: PRIMARY CARE | Facility: CLINIC | Age: 76
End: 2026-01-08
Payer: MEDICARE

## (undated) DEVICE — AGILIS HISPRO SLITTER

## (undated) DEVICE — Device

## (undated) DEVICE — WRENCH, HEX

## (undated) DEVICE — CABLE, SURGICAL, SM CLIP

## (undated) DEVICE — PHOTONBLADE, WITH ADAPTIVE SMOKE EVAC

## (undated) DEVICE — HELIX LOCKING TOOL

## (undated) DEVICE — ACCESS KIT, S-MAK MINI, 4FR 10CM 0.018IN 40CM, NT/PT, ECHO ENHANCE NEEDLE

## (undated) DEVICE — ELECTRODE, QUICK-COMBO, EDGE SYSTEM, REDI PACK

## (undated) DEVICE — ENVELOPE, ANTIBACTERIAL, AIGIS RX TYRX, ABSORBABLE, LRG

## (undated) DEVICE — GUIDEWIRE, J-TIP, AMPLATZ SUPER STIFF, 0.035 IN X 180 CM